# Patient Record
Sex: FEMALE | Race: WHITE | NOT HISPANIC OR LATINO | Employment: OTHER | URBAN - METROPOLITAN AREA
[De-identification: names, ages, dates, MRNs, and addresses within clinical notes are randomized per-mention and may not be internally consistent; named-entity substitution may affect disease eponyms.]

---

## 2017-02-14 RX ORDER — OMEPRAZOLE 20 MG/1
CAPSULE, DELAYED RELEASE ORAL
Qty: 90 CAPSULE | Refills: 2 | Status: SHIPPED | OUTPATIENT
Start: 2017-02-14 | End: 2017-11-07 | Stop reason: SDUPTHER

## 2017-04-03 ENCOUNTER — RESULTS ENCOUNTER (OUTPATIENT)
Dept: INTERNAL MEDICINE | Facility: CLINIC | Age: 82
End: 2017-04-03

## 2017-04-03 DIAGNOSIS — E11.8 TYPE 2 DIABETES MELLITUS WITH COMPLICATION, UNSPECIFIED LONG TERM INSULIN USE STATUS: ICD-10-CM

## 2017-04-03 DIAGNOSIS — E78.5 HYPERLIPIDEMIA, UNSPECIFIED HYPERLIPIDEMIA TYPE: ICD-10-CM

## 2017-04-11 LAB
ALBUMIN SERPL-MCNC: 4.1 G/DL (ref 3.5–5.2)
ALBUMIN/GLOB SERPL: 1.5 G/DL
ALP SERPL-CCNC: 70 U/L (ref 39–117)
ALT SERPL-CCNC: 26 U/L (ref 1–33)
AST SERPL-CCNC: 27 U/L (ref 1–32)
BILIRUB SERPL-MCNC: 0.5 MG/DL (ref 0.1–1.2)
BUN SERPL-MCNC: 28 MG/DL (ref 8–23)
BUN/CREAT SERPL: 38.4 (ref 7–25)
CALCIUM SERPL-MCNC: 9.6 MG/DL (ref 8.6–10.5)
CHLORIDE SERPL-SCNC: 102 MMOL/L (ref 98–107)
CHOLEST SERPL-MCNC: 179 MG/DL (ref 0–200)
CO2 SERPL-SCNC: 30.4 MMOL/L (ref 22–29)
CREAT SERPL-MCNC: 0.73 MG/DL (ref 0.57–1)
GLOBULIN SER CALC-MCNC: 2.8 GM/DL
GLUCOSE SERPL-MCNC: 122 MG/DL (ref 65–99)
HBA1C MFR BLD: 5.85 % (ref 4.8–5.6)
HDLC SERPL-MCNC: 73 MG/DL (ref 40–60)
LDLC SERPL CALC-MCNC: 94 MG/DL (ref 0–100)
LDLC/HDLC SERPL: 1.29 {RATIO}
POTASSIUM SERPL-SCNC: 3.7 MMOL/L (ref 3.5–5.2)
PROT SERPL-MCNC: 6.9 G/DL (ref 6–8.5)
SODIUM SERPL-SCNC: 146 MMOL/L (ref 136–145)
TRIGL SERPL-MCNC: 60 MG/DL (ref 0–150)
TSH SERPL DL<=0.005 MIU/L-ACNC: 1.52 MIU/ML (ref 0.27–4.2)
VLDLC SERPL CALC-MCNC: 12 MG/DL (ref 5–40)

## 2017-04-14 ENCOUNTER — OFFICE VISIT (OUTPATIENT)
Dept: INTERNAL MEDICINE | Facility: CLINIC | Age: 82
End: 2017-04-14

## 2017-04-14 VITALS
HEIGHT: 60 IN | WEIGHT: 158.6 LBS | SYSTOLIC BLOOD PRESSURE: 112 MMHG | OXYGEN SATURATION: 96 % | DIASTOLIC BLOOD PRESSURE: 58 MMHG | BODY MASS INDEX: 31.14 KG/M2 | HEART RATE: 74 BPM

## 2017-04-14 DIAGNOSIS — E78.5 HYPERLIPIDEMIA, UNSPECIFIED HYPERLIPIDEMIA TYPE: ICD-10-CM

## 2017-04-14 DIAGNOSIS — R73.09 ELEVATED GLUCOSE: ICD-10-CM

## 2017-04-14 DIAGNOSIS — I10 ESSENTIAL HYPERTENSION: Primary | ICD-10-CM

## 2017-04-14 DIAGNOSIS — K21.9 GASTROESOPHAGEAL REFLUX DISEASE, ESOPHAGITIS PRESENCE NOT SPECIFIED: ICD-10-CM

## 2017-04-14 DIAGNOSIS — E03.8 OTHER SPECIFIED HYPOTHYROIDISM: ICD-10-CM

## 2017-04-14 PROCEDURE — 99214 OFFICE O/P EST MOD 30 MIN: CPT | Performed by: INTERNAL MEDICINE

## 2017-04-14 NOTE — PROGRESS NOTES
Subjective   Jessy Eubanks is a 82 y.o. female here today to f/u on HTN, hyperlipidemia, DM and hypothyroidism.  Pt c/o bilateral ear fullness and weight loss medicine.      History of Present Illness   Pt has been taking BP meds as prescribed without any problems.  No HA  No episodes of orthostasis  Pt has been taking cholesterol meds as prescribed.  No difficulties with myalgias.   Pt has been doing well with thyroid meds.  Taking as perscribed without any complications  She does have some chronic LBP with some sciatica- she is getting some relief from aleve    The following portions of the patient's history were reviewed and updated as appropriate: allergies, current medications, past medical history, past social history and problem list.  SHe does not like to eat much for veggies    Review of Systems   All other systems reviewed and are negative.      Objective   Physical Exam   Constitutional: She is oriented to person, place, and time. She appears well-developed and well-nourished.   HENT:   Head: Normocephalic and atraumatic.   Right Ear: External ear normal.   Left Ear: External ear normal.   Mouth/Throat: Oropharynx is clear and moist.   Eyes: Conjunctivae and EOM are normal. Pupils are equal, round, and reactive to light.   Neck: Normal range of motion. No tracheal deviation present. No thyromegaly present.   Cardiovascular: Normal rate, regular rhythm, normal heart sounds and intact distal pulses.    occas extra beat   Pulmonary/Chest: Effort normal and breath sounds normal.   Abdominal: Soft. Bowel sounds are normal. She exhibits no distension. There is no tenderness.   Musculoskeletal: Normal range of motion. She exhibits no edema or deformity.   Neurological: She is alert and oriented to person, place, and time.   Skin: Skin is warm and dry.   Psychiatric: She has a normal mood and affect. Her behavior is normal. Judgment and thought content normal.   Vitals reviewed.      Vitals:    04/14/17 1059    BP: 112/58   Pulse: 74   SpO2: 96%        Results Encounter on 04/03/2017   Component Date Value Ref Range Status   • Glucose 04/11/2017 122* 65 - 99 mg/dL Final   • BUN 04/11/2017 28* 8 - 23 mg/dL Final   • Creatinine 04/11/2017 0.73  0.57 - 1.00 mg/dL Final   • eGFR Non African Am 04/11/2017 76  >60 mL/min/1.73 Final    Comment: The MDRD GFR formula is only valid for adults with stable  renal function between ages 18 and 70.     • eGFR  Am 04/11/2017 93  >60 mL/min/1.73 Final   • BUN/Creatinine Ratio 04/11/2017 38.4* 7.0 - 25.0 Final   • Sodium 04/11/2017 146* 136 - 145 mmol/L Final   • Potassium 04/11/2017 3.7  3.5 - 5.2 mmol/L Final   • Chloride 04/11/2017 102  98 - 107 mmol/L Final   • Total CO2 04/11/2017 30.4* 22.0 - 29.0 mmol/L Final   • Calcium 04/11/2017 9.6  8.6 - 10.5 mg/dL Final   • Total Protein 04/11/2017 6.9  6.0 - 8.5 g/dL Final   • Albumin 04/11/2017 4.10  3.50 - 5.20 g/dL Final   • Globulin 04/11/2017 2.8  gm/dL Final   • A/G Ratio 04/11/2017 1.5  g/dL Final   • Total Bilirubin 04/11/2017 0.5  0.1 - 1.2 mg/dL Final   • Alkaline Phosphatase 04/11/2017 70  39 - 117 U/L Final   • AST (SGOT) 04/11/2017 27  1 - 32 U/L Final   • ALT (SGPT) 04/11/2017 26  1 - 33 U/L Final   • Total Cholesterol 04/11/2017 179  0 - 200 mg/dL Final   • Triglycerides 04/11/2017 60  0 - 150 mg/dL Final   • HDL Cholesterol 04/11/2017 73* 40 - 60 mg/dL Final   • VLDL Cholesterol 04/11/2017 12  5 - 40 mg/dL Final   • LDL Cholesterol  04/11/2017 94  0 - 100 mg/dL Final   • LDL/HDL Ratio 04/11/2017 1.29   Final   • TSH 04/11/2017 1.52  0.27 - 4.2 mIU/mL Final   • Hemoglobin A1C 04/11/2017 5.85* 4.80 - 5.60 % Final    Comment: Hemoglobin A1C Ranges:  Increased Risk for Diabetes  5.7% to 6.4%  Diabetes                     >= 6.5%  Diabetic Goal                < 7.0%         Current Outpatient Prescriptions:   •  aspirin 325 MG tablet, Take  by mouth., Disp: , Rfl:   •  atorvastatin (LIPITOR) 40 MG tablet, TAKE 1 TABLET  DAILY, Disp: 90 tablet, Rfl: 1  •  calcium (OS-ELIZABETH) 600 MG tablet, Take  by mouth daily., Disp: , Rfl:   •  Cholecalciferol (VITAMIN D3) 2000 UNITS tablet, Take 1 capsule by mouth., Disp: , Rfl:   •  Coenzyme Q10 (CO Q 10) 100 MG capsule, Take 1 capsule by mouth Daily., Disp: , Rfl:   •  levothyroxine (SYNTHROID, LEVOTHROID) 100 MCG tablet, Take 1 tablet by mouth daily., Disp: 90 tablet, Rfl: 3  •  losartan-hydrochlorothiazide (HYZAAR) 50-12.5 MG per tablet, TAKE 1 TABLET TWICE A DAY, Disp: 180 tablet, Rfl: 1  •  omeprazole (priLOSEC) 20 MG capsule, TAKE 1 CAPSULE DAILY, Disp: 90 capsule, Rfl: 2  •  potassium chloride (K-DUR,KLOR-CON) 20 MEQ CR tablet, TAKE 1 TABLET DAILY, Disp: 90 tablet, Rfl: 1      Assessment/Plan   Diagnoses and all orders for this visit:    Essential hypertension    Hyperlipidemia, unspecified hyperlipidemia type    Gastroesophageal reflux disease, esophagitis presence not specified    Other specified hypothyroidism      1. HTN- She is doing well with current meds  2. HPL- she is doing well with atorvastatin  3. GERD-she is doing well with   4. Hypothyroidism-She is doing well with current dose  5. LBP- she needs to be cautious with overusing the nsaids  She does not want ot see pain management and PT has not helped  SHe has had this for years and is stab;e

## 2017-05-01 RX ORDER — LOSARTAN POTASSIUM AND HYDROCHLOROTHIAZIDE 12.5; 5 MG/1; MG/1
TABLET ORAL
Qty: 180 TABLET | Refills: 1 | Status: SHIPPED | OUTPATIENT
Start: 2017-05-01 | End: 2017-10-12 | Stop reason: SDUPTHER

## 2017-05-01 RX ORDER — LEVOTHYROXINE SODIUM 0.1 MG/1
TABLET ORAL
Qty: 90 TABLET | Refills: 1 | Status: SHIPPED | OUTPATIENT
Start: 2017-05-01 | End: 2017-10-12 | Stop reason: SDUPTHER

## 2017-05-01 RX ORDER — POTASSIUM CHLORIDE 20 MEQ/1
TABLET, EXTENDED RELEASE ORAL
Qty: 90 TABLET | Refills: 1 | Status: SHIPPED | OUTPATIENT
Start: 2017-05-01 | End: 2017-10-12 | Stop reason: SDUPTHER

## 2017-06-16 RX ORDER — ATORVASTATIN CALCIUM 40 MG/1
TABLET, FILM COATED ORAL
Qty: 90 TABLET | Refills: 1 | Status: SHIPPED | OUTPATIENT
Start: 2017-06-16 | End: 2017-11-21 | Stop reason: SDUPTHER

## 2017-10-06 DIAGNOSIS — R73.09 ELEVATED GLUCOSE: ICD-10-CM

## 2017-10-06 DIAGNOSIS — I10 ESSENTIAL HYPERTENSION: ICD-10-CM

## 2017-10-06 DIAGNOSIS — E78.5 HYPERLIPIDEMIA, UNSPECIFIED HYPERLIPIDEMIA TYPE: ICD-10-CM

## 2017-10-10 LAB
ALBUMIN SERPL-MCNC: 3.8 G/DL (ref 3.5–5.2)
ALBUMIN/GLOB SERPL: 1.5 G/DL
ALP SERPL-CCNC: 73 U/L (ref 39–117)
ALT SERPL-CCNC: 20 U/L (ref 1–33)
AST SERPL-CCNC: 25 U/L (ref 1–32)
BILIRUB SERPL-MCNC: 0.5 MG/DL (ref 0.1–1.2)
BUN SERPL-MCNC: 17 MG/DL (ref 8–23)
BUN/CREAT SERPL: 21.8 (ref 7–25)
CALCIUM SERPL-MCNC: 9.3 MG/DL (ref 8.6–10.5)
CHLORIDE SERPL-SCNC: 101 MMOL/L (ref 98–107)
CHOLEST SERPL-MCNC: 143 MG/DL (ref 0–200)
CO2 SERPL-SCNC: 31 MMOL/L (ref 22–29)
CREAT SERPL-MCNC: 0.78 MG/DL (ref 0.57–1)
FT4I SERPL CALC-MCNC: 2.3 (ref 1.2–4.9)
GLOBULIN SER CALC-MCNC: 2.5 GM/DL
GLUCOSE SERPL-MCNC: 123 MG/DL (ref 65–99)
HBA1C MFR BLD: 6.1 % (ref 4.8–5.6)
HDLC SERPL-MCNC: 61 MG/DL (ref 40–60)
LDLC SERPL CALC-MCNC: 67 MG/DL (ref 0–100)
LDLC/HDLC SERPL: 1.1 {RATIO}
POTASSIUM SERPL-SCNC: 3.4 MMOL/L (ref 3.5–5.2)
PROT SERPL-MCNC: 6.3 G/DL (ref 6–8.5)
SODIUM SERPL-SCNC: 144 MMOL/L (ref 136–145)
T3RU NFR SERPL: 28 % (ref 24–39)
T4 SERPL-MCNC: 8.3 UG/DL (ref 4.5–12)
TRIGL SERPL-MCNC: 73 MG/DL (ref 0–150)
TSH SERPL DL<=0.005 MIU/L-ACNC: 1.15 UIU/ML (ref 0.45–4.5)
VLDLC SERPL CALC-MCNC: 14.6 MG/DL (ref 5–40)

## 2017-10-12 RX ORDER — LEVOTHYROXINE SODIUM 0.1 MG/1
TABLET ORAL
Qty: 90 TABLET | Refills: 1 | Status: SHIPPED | OUTPATIENT
Start: 2017-10-12 | End: 2018-04-06 | Stop reason: SDUPTHER

## 2017-10-12 RX ORDER — POTASSIUM CHLORIDE 20 MEQ/1
TABLET, EXTENDED RELEASE ORAL
Qty: 90 TABLET | Refills: 1 | Status: SHIPPED | OUTPATIENT
Start: 2017-10-12 | End: 2018-04-06 | Stop reason: SDUPTHER

## 2017-10-12 RX ORDER — LOSARTAN POTASSIUM AND HYDROCHLOROTHIAZIDE 12.5; 5 MG/1; MG/1
TABLET ORAL
Qty: 180 TABLET | Refills: 1 | Status: SHIPPED | OUTPATIENT
Start: 2017-10-12 | End: 2018-04-06 | Stop reason: SDUPTHER

## 2017-10-13 ENCOUNTER — OFFICE VISIT (OUTPATIENT)
Dept: INTERNAL MEDICINE | Facility: CLINIC | Age: 82
End: 2017-10-13

## 2017-10-13 VITALS
BODY MASS INDEX: 29.84 KG/M2 | HEIGHT: 60 IN | HEART RATE: 81 BPM | DIASTOLIC BLOOD PRESSURE: 68 MMHG | WEIGHT: 152 LBS | SYSTOLIC BLOOD PRESSURE: 126 MMHG | OXYGEN SATURATION: 96 %

## 2017-10-13 DIAGNOSIS — E11.8 TYPE 2 DIABETES MELLITUS WITH COMPLICATION, UNSPECIFIED LONG TERM INSULIN USE STATUS: Primary | ICD-10-CM

## 2017-10-13 DIAGNOSIS — Z00.00 MEDICARE ANNUAL WELLNESS VISIT, INITIAL: ICD-10-CM

## 2017-10-13 DIAGNOSIS — I10 ESSENTIAL HYPERTENSION: ICD-10-CM

## 2017-10-13 DIAGNOSIS — E78.5 HYPERLIPIDEMIA, UNSPECIFIED HYPERLIPIDEMIA TYPE: ICD-10-CM

## 2017-10-13 DIAGNOSIS — M25.475 SWELLING OF FOOT JOINT, LEFT: ICD-10-CM

## 2017-10-13 PROCEDURE — 90662 IIV NO PRSV INCREASED AG IM: CPT | Performed by: INTERNAL MEDICINE

## 2017-10-13 PROCEDURE — G0438 PPPS, INITIAL VISIT: HCPCS | Performed by: INTERNAL MEDICINE

## 2017-10-13 PROCEDURE — 99213 OFFICE O/P EST LOW 20 MIN: CPT | Performed by: INTERNAL MEDICINE

## 2017-10-13 PROCEDURE — G0008 ADMIN INFLUENZA VIRUS VAC: HCPCS | Performed by: INTERNAL MEDICINE

## 2017-10-13 RX ORDER — NAPROXEN SODIUM 220 MG
220 TABLET ORAL 2 TIMES DAILY WITH MEALS
COMMUNITY
End: 2022-02-28

## 2017-10-13 NOTE — PROGRESS NOTES
QUICK REFERENCE INFORMATION:  The ABCs of the Annual Wellness Visit    Initial Medicare Wellness Visit    HEALTH RISK ASSESSMENT    1934    Recent Hospitalizations:  No hospitalization(s) within the last year..        Current Medical Providers:  Patient Care Team:  Any Carbajal MD as PCP - General  Katherin Carreno DPM as PCP - Claims Attributed        Smoking Status:  History   Smoking Status   • Never Smoker   Smokeless Tobacco   • Not on file       Alcohol Consumption:  History   Alcohol Use No       Depression Screen:   PHQ-2/PHQ-9 Depression Screening 10/13/2017   Little interest or pleasure in doing things 0   Feeling down, depressed, or hopeless 0   Trouble falling or staying asleep, or sleeping too much 3   Feeling tired or having little energy 0   Poor appetite or overeating 0   Feeling bad about yourself - or that you are a failure or have let yourself or your family down 0   Trouble concentrating on things, such as reading the newspaper or watching television 0   Moving or speaking so slowly that other people could have noticed. Or the opposite - being so fidgety or restless that you have been moving around a lot more than usual 0   Thoughts that you would be better off dead, or of hurting yourself in some way 0   Total Score 3       Health Habits and Functional and Cognitive Screening:  Functional & Cognitive Status 10/13/2017   Do you have difficulty preparing food and eating? No   Do you have difficulty bathing yourself? No   Do you have difficulty getting dressed? No   Do you have difficulty using the toilet? No   Do you have difficulty moving around from place to place? No   In the past year have you fallen or experienced a near fall? No   Do you need help using the phone?  No   Are you deaf or do you have serious difficulty hearing?  No   Do you need help with transportation? No   Do you need help shopping? No   Do you need help preparing meals?  No   Do you need help with housework?  No   Do you  need help with laundry? No   Do you need help taking your medications? No   Do you need help managing money? No   Do you have difficulty concentrating, remembering or making decisions? No                  Does the patient have evidence of cognitive impairment? No    Asiprin use counseling: Does not need ASA but is currently taking (advised patient that ASA is not indicated and patient chooses to stop it)      Recent Lab Results:    Visual Acuity:  No exam data present    Age-appropriate Screening Schedule:  Refer to the list below for future screening recommendations based on patient's age, sex and/or medical conditions. Orders for these recommended tests are listed in the plan section. The patient has been provided with a written plan.    Health Maintenance   Topic Date Due   • TDAP/TD VACCINES (1 - Tdap) 10/07/1953   • INFLUENZA VACCINE  08/01/2017   • HEMOGLOBIN A1C  04/09/2018   • DIABETIC FOOT EXAM  04/14/2018   • URINE MICROALBUMIN  04/14/2018   • DIABETIC EYE EXAM  05/31/2018   • LIPID PANEL  10/09/2018   • MAMMOGRAM  04/14/2019   • DXA SCAN  04/14/2019   • PNEUMOCOCCAL VACCINES (65+ LOW/MEDIUM RISK)  Completed   • ZOSTER VACCINE  Completed        Subjective   History of Present Illness    Jessy Eubanks is a 83 y.o. female who presents for an Annual Wellness Visit.    The following portions of the patient's history were reviewed and updated as appropriate: allergies, current medications, past family history, past medical history, past social history, past surgical history and problem list.    Outpatient Medications Prior to Visit   Medication Sig Dispense Refill   • aspirin 325 MG tablet Take  by mouth.     • atorvastatin (LIPITOR) 40 MG tablet TAKE 1 TABLET DAILY 90 tablet 1   • calcium (OS-ELIZABETH) 600 MG tablet Take  by mouth daily.     • Cholecalciferol (VITAMIN D3) 2000 UNITS tablet Take 1 capsule by mouth.     • Coenzyme Q10 (CO Q 10) 100 MG capsule Take 1 capsule by mouth Daily.     • levothyroxine  "(SYNTHROID, LEVOTHROID) 100 MCG tablet TAKE 1 TABLET DAILY 90 tablet 1   • losartan-hydrochlorothiazide (HYZAAR) 50-12.5 MG per tablet TAKE 1 TABLET TWICE A  tablet 1   • omeprazole (priLOSEC) 20 MG capsule TAKE 1 CAPSULE DAILY 90 capsule 2   • potassium chloride (K-DUR,KLOR-CON) 20 MEQ CR tablet TAKE 1 TABLET DAILY 90 tablet 1     No facility-administered medications prior to visit.        Patient Active Problem List   Diagnosis   • Diabetic peripheral neuropathy   • Gastroesophageal reflux disease   • Hyperlipidemia   • Hypertension   • Hypothyroidism   • Restless legs syndrome   • Type 2 diabetes mellitus   • Cobalamin deficiency   • Vitamin D deficiency   • Sciatica   • Hematuria       Advance Care Planning:  has an advance directive - a copy HAS NOT been provided. Have asked the patient to send this to us to add to record.    Identification of Risk Factors:  Risk factors include: weight , cardiovascular risk and increased fall risk. She has been doing well with a decreased calorie diet    Review of Systems    Compared to one year ago, the patient feels her physical health is the same.  Compared to one year ago, the patient feels her mental health is the same.    Objective     Physical Exam    Vitals:    10/13/17 1120   BP: 126/68   BP Location: Left arm   Patient Position: Sitting   Pulse: 81   SpO2: 96%   Weight: 152 lb (68.9 kg)   Height: 60.25\" (153 cm)   PainSc:   5       Body mass index is 29.44 kg/(m^2).  Discussed the patient's BMI with her. The BMI is above average; BMI management plan is completed.    Assessment/Plan   Patient Self-Management and Personalized Health Advice  The patient has been provided with information about: diet, exercise, prevention of cardiac or vascular disease, the relationship between weight and GERD and fall prevention and preventive services including:   · Exercise counseling provided, Fall Risk plan of care done, Nutrition counseling provided.    Visit Diagnoses:    " ICD-10-CM ICD-9-CM   1. Type 2 diabetes mellitus with complication, unspecified long term insulin use status E11.8 250.90   2. Essential hypertension I10 401.9   3. Hyperlipidemia, unspecified hyperlipidemia type E78.5 272.4   4. Swelling of foot joint, left M25.475 719.07       No orders of the defined types were placed in this encounter.      Outpatient Encounter Prescriptions as of 10/13/2017   Medication Sig Dispense Refill   • aspirin 325 MG tablet Take  by mouth.     • atorvastatin (LIPITOR) 40 MG tablet TAKE 1 TABLET DAILY 90 tablet 1   • calcium (OS-ELIZABETH) 600 MG tablet Take  by mouth daily.     • Cholecalciferol (VITAMIN D3) 2000 UNITS tablet Take 1 capsule by mouth.     • Coenzyme Q10 (CO Q 10) 100 MG capsule Take 1 capsule by mouth Daily.     • levothyroxine (SYNTHROID, LEVOTHROID) 100 MCG tablet TAKE 1 TABLET DAILY 90 tablet 1   • losartan-hydrochlorothiazide (HYZAAR) 50-12.5 MG per tablet TAKE 1 TABLET TWICE A  tablet 1   • naproxen sodium (ALEVE) 220 MG tablet Take 220 mg by mouth 2 (Two) Times a Day With Meals.     • Omega-3 Fatty Acids (OMEGA 3 PO) Take 1 capsule by mouth Daily.     • omeprazole (priLOSEC) 20 MG capsule TAKE 1 CAPSULE DAILY 90 capsule 2   • potassium chloride (K-DUR,KLOR-CON) 20 MEQ CR tablet TAKE 1 TABLET DAILY 90 tablet 1     No facility-administered encounter medications on file as of 10/13/2017.        Reviewed use of high risk medication in the elderly: yes  Reviewed for potential of harmful drug interactions in the elderly: yes    Follow Up:  No Follow-up on file.     An After Visit Summary and PPPS with all of these plans were given to the patient.   She is continuing to work on diet and exercise  She is cautious on steps   I have rec silver sneakers

## 2017-10-13 NOTE — PATIENT INSTRUCTIONS
Fall Prevention in the Home   Falls can cause injuries. They can happen to people of all ages. There are many things you can do to make your home safe and to help prevent falls.   WHAT CAN I DO ON THE OUTSIDE OF MY HOME?  · Regularly fix the edges of walkways and driveways and fix any cracks.  · Remove anything that might make you trip as you walk through a door, such as a raised step or threshold.  · Trim any bushes or trees on the path to your home.  · Use bright outdoor lighting.  · Clear any walking paths of anything that might make someone trip, such as rocks or tools.  · Regularly check to see if handrails are loose or broken. Make sure that both sides of any steps have handrails.  · Any raised decks and porches should have guardrails on the edges.  · Have any leaves, snow, or ice cleared regularly.  · Use sand or salt on walking paths during winter.  · Clean up any spills in your garage right away. This includes oil or grease spills.  WHAT CAN I DO IN THE BATHROOM?   · Use night lights.  · Install grab bars by the toilet and in the tub and shower. Do not use towel bars as grab bars.  · Use non-skid mats or decals in the tub or shower.  · If you need to sit down in the shower, use a plastic, non-slip stool.  · Keep the floor dry. Clean up any water that spills on the floor as soon as it happens.  · Remove soap buildup in the tub or shower regularly.  · Attach bath mats securely with double-sided non-slip rug tape.  · Do not have throw rugs and other things on the floor that can make you trip.  WHAT CAN I DO IN THE BEDROOM?  · Use night lights.  · Make sure that you have a light by your bed that is easy to reach.  · Do not use any sheets or blankets that are too big for your bed. They should not hang down onto the floor.  · Have a firm chair that has side arms. You can use this for support while you get dressed.  · Do not have throw rugs and other things on the floor that can make you trip.  WHAT CAN I DO IN  THE KITCHEN?  · Clean up any spills right away.  · Avoid walking on wet floors.  · Keep items that you use a lot in easy-to-reach places.  · If you need to reach something above you, use a strong step stool that has a grab bar.  · Keep electrical cords out of the way.  · Do not use floor polish or wax that makes floors slippery. If you must use wax, use non-skid floor wax.  · Do not have throw rugs and other things on the floor that can make you trip.  WHAT CAN I DO WITH MY STAIRS?  · Do not leave any items on the stairs.  · Make sure that there are handrails on both sides of the stairs and use them. Fix handrails that are broken or loose. Make sure that handrails are as long as the stairways.  · Check any carpeting to make sure that it is firmly attached to the stairs. Fix any carpet that is loose or worn.  · Avoid having throw rugs at the top or bottom of the stairs. If you do have throw rugs, attach them to the floor with carpet tape.  · Make sure that you have a light switch at the top of the stairs and the bottom of the stairs. If you do not have them, ask someone to add them for you.  WHAT ELSE CAN I DO TO HELP PREVENT FALLS?  · Wear shoes that:    Do not have high heels.    Have rubber bottoms.    Are comfortable and fit you well.    Are closed at the toe. Do not wear sandals.  · If you use a stepladder:    Make sure that it is fully opened. Do not climb a closed stepladder.    Make sure that both sides of the stepladder are locked into place.    Ask someone to hold it for you, if possible.  · Clearly riky and make sure that you can see:    Any grab bars or handrails.    First and last steps.    Where the edge of each step is.  · Use tools that help you move around (mobility aids) if they are needed. These include:    Canes.    Walkers.    Scooters.    Crutches.  · Turn on the lights when you go into a dark area. Replace any light bulbs as soon as they burn out.  · Set up your furniture so you have a clear  path. Avoid moving your furniture around.  · If any of your floors are uneven, fix them.  · If there are any pets around you, be aware of where they are.  · Review your medicines with your doctor. Some medicines can make you feel dizzy. This can increase your chance of falling.  Ask your doctor what other things that you can do to help prevent falls.     This information is not intended to replace advice given to you by your health care provider. Make sure you discuss any questions you have with your health care provider.     Document Released: 10/14/2010 Document Revised: 2016 Document Reviewed: 2016  [employer] Interactive Patient Education © [employer]    Medicare Wellness  Personal Prevention Plan of Service     Date of Office Visit:  10/13/2017  Encounter Provider:  Any Carbajal MD  Place of Service:  [facility]  Patient Name: Jessy Eubanks  :  1934    As part of the Medicare Wellness portion of your visit today, we are providing you with this personalized preventive plan of services (PPPS). This plan is based upon recommendations of the [place] Preventive Services Task Force (USPSTF) and the Advisory Committee on Immunization Practices (ACIP).    This lists the preventive care services that should be considered, and provides dates of when you are due. Items listed as completed are up-to-date and do not require any further intervention.    Health Maintenance   Topic Date Due   • TDAP/TD VACCINES (1 - Tdap) 10/07/1953   • INFLUENZA VACCINE  2017   • HEMOGLOBIN A1C  2018   • DIABETIC FOOT EXAM  2018   • URINE MICROALBUMIN  2018   • DIABETIC EYE EXAM  2018   • LIPID PANEL  10/09/2018   • MEDICARE ANNUAL WELLNESS  10/13/2018   • MAMMOGRAM  2019   • DXA SCAN  2019   • PNEUMOCOCCAL VACCINES (65+ LOW/MEDIUM RISK)  Completed   • ZOSTER VACCINE  Completed       No orders of the defined types were placed in  this encounter.      No Follow-up on file.

## 2017-10-13 NOTE — PROGRESS NOTES
Subjective   Jessy Eubanks is a 83 y.o. female here today to f/u on Hyperlipidemia, HTN, DM and hypothyroidism.  Pt c/o left sciatica pain that is causing calf pain and left foot swelling.  Pt uses solanpas patches.      History of Present Illness   Pt has been taking BP meds as prescribed without any problems.  No HA  No episodes of orthostasis  Pt has been doing well with thyroid meds.  Taking as perscribed without any complications  She had been using a strap around her left calf to help with the pain that radiates into her calf    The following portions of the patient's history were reviewed and updated as appropriate: allergies, current medications, past medical history, past social history and problem list.  She denies any change in diet    Review of Systems   All other systems reviewed and are negative.      Objective   Physical Exam   Constitutional: She is oriented to person, place, and time. She appears well-developed and well-nourished.   HENT:   Head: Normocephalic and atraumatic.   Right Ear: External ear normal.   Left Ear: External ear normal.   Mouth/Throat: Oropharynx is clear and moist.   Eyes: Conjunctivae and EOM are normal. Pupils are equal, round, and reactive to light.   Neck: Normal range of motion. No tracheal deviation present. No thyromegaly present.   Cardiovascular: Normal rate, regular rhythm, normal heart sounds and intact distal pulses.    Pulmonary/Chest: Effort normal and breath sounds normal.   Musculoskeletal: Normal range of motion. She exhibits no edema or deformity.   Left foot and ankle edema   Neurological: She is alert and oriented to person, place, and time.   Skin: Skin is warm and dry.   Psychiatric: She has a normal mood and affect. Her behavior is normal. Judgment and thought content normal.   Vitals reviewed.      Vitals:    10/13/17 1120   BP: 126/68   Pulse: 81   SpO2: 96%     Orders Only on 10/06/2017   Component Date Value Ref Range Status   • Glucose  10/09/2017 123* 65 - 99 mg/dL Final   • BUN 10/09/2017 17  8 - 23 mg/dL Final   • Creatinine 10/09/2017 0.78  0.57 - 1.00 mg/dL Final   • eGFR Non  Am 10/09/2017 71  >60 mL/min/1.73 Final    Comment: The MDRD GFR formula is only valid for adults with stable  renal function between ages 18 and 70.     • eGFR  Am 10/09/2017 85  >60 mL/min/1.73 Final   • BUN/Creatinine Ratio 10/09/2017 21.8  7.0 - 25.0 Final   • Sodium 10/09/2017 144  136 - 145 mmol/L Final   • Potassium 10/09/2017 3.4* 3.5 - 5.2 mmol/L Final   • Chloride 10/09/2017 101  98 - 107 mmol/L Final   • Total CO2 10/09/2017 31.0* 22.0 - 29.0 mmol/L Final   • Calcium 10/09/2017 9.3  8.6 - 10.5 mg/dL Final   • Total Protein 10/09/2017 6.3  6.0 - 8.5 g/dL Final   • Albumin 10/09/2017 3.80  3.50 - 5.20 g/dL Final   • Globulin 10/09/2017 2.5  gm/dL Final   • A/G Ratio 10/09/2017 1.5  g/dL Final   • Total Bilirubin 10/09/2017 0.5  0.1 - 1.2 mg/dL Final   • Alkaline Phosphatase 10/09/2017 73  39 - 117 U/L Final   • AST (SGOT) 10/09/2017 25  1 - 32 U/L Final   • ALT (SGPT) 10/09/2017 20  1 - 33 U/L Final   • Total Cholesterol 10/09/2017 143  0 - 200 mg/dL Final   • Triglycerides 10/09/2017 73  0 - 150 mg/dL Final   • HDL Cholesterol 10/09/2017 61* 40 - 60 mg/dL Final   • VLDL Cholesterol 10/09/2017 14.6  5 - 40 mg/dL Final   • LDL Cholesterol  10/09/2017 67  0 - 100 mg/dL Final   • LDL/HDL Ratio 10/09/2017 1.10   Final   • TSH 10/09/2017 1.150  0.450 - 4.500 uIU/mL Final   • T4, Total 10/09/2017 8.3  4.5 - 12.0 ug/dL Final   • T3 Uptake 10/09/2017 28  24 - 39 % Final   • Free Thyroxine Index 10/09/2017 2.3  1.2 - 4.9 Final   • Hemoglobin A1C 10/09/2017 6.10* 4.80 - 5.60 % Final    Comment: Hemoglobin A1C Ranges:  Increased Risk for Diabetes  5.7% to 6.4%  Diabetes                     >= 6.5%  Diabetic Goal                < 7.0%          Current Outpatient Prescriptions:   •  aspirin 325 MG tablet, Take  by mouth., Disp: , Rfl:   •  atorvastatin  (LIPITOR) 40 MG tablet, TAKE 1 TABLET DAILY, Disp: 90 tablet, Rfl: 1  •  calcium (OS-ELIZABETH) 600 MG tablet, Take  by mouth daily., Disp: , Rfl:   •  Cholecalciferol (VITAMIN D3) 2000 UNITS tablet, Take 1 capsule by mouth., Disp: , Rfl:   •  Coenzyme Q10 (CO Q 10) 100 MG capsule, Take 1 capsule by mouth Daily., Disp: , Rfl:   •  levothyroxine (SYNTHROID, LEVOTHROID) 100 MCG tablet, TAKE 1 TABLET DAILY, Disp: 90 tablet, Rfl: 1  •  losartan-hydrochlorothiazide (HYZAAR) 50-12.5 MG per tablet, TAKE 1 TABLET TWICE A DAY, Disp: 180 tablet, Rfl: 1  •  naproxen sodium (ALEVE) 220 MG tablet, Take 220 mg by mouth 2 (Two) Times a Day With Meals., Disp: , Rfl:   •  Omega-3 Fatty Acids (OMEGA 3 PO), Take 1 capsule by mouth Daily., Disp: , Rfl:   •  omeprazole (priLOSEC) 20 MG capsule, TAKE 1 CAPSULE DAILY, Disp: 90 capsule, Rfl: 2  •  potassium chloride (K-DUR,KLOR-CON) 20 MEQ CR tablet, TAKE 1 TABLET DAILY, Disp: 90 tablet, Rfl: 1      Assessment/Plan   Diagnoses and all orders for this visit:    Type 2 diabetes mellitus with complication, unspecified long term insulin use status    Essential hypertension    Hyperlipidemia, unspecified hyperlipidemia type    Swelling of foot joint, left    1. DM- well controlled  2. Left foot swelling-  I believe that this is related to the strap she wore around her left leg   THis has since been removed and we will check a doppler  3. HTN-doing well with losartan  4. HPL- doing well with atorvastatin  5. gerd-she wants to do a trial off the ppi

## 2017-10-18 ENCOUNTER — TELEPHONE (OUTPATIENT)
Dept: INTERNAL MEDICINE | Facility: CLINIC | Age: 82
End: 2017-10-18

## 2017-10-18 DIAGNOSIS — M79.89 PAIN AND SWELLING OF LEFT LOWER LEG: Primary | ICD-10-CM

## 2017-10-18 DIAGNOSIS — M79.662 PAIN AND SWELLING OF LEFT LOWER LEG: Primary | ICD-10-CM

## 2017-10-18 NOTE — TELEPHONE ENCOUNTER
I CALLED THE [facility] AND [facility] IN BOTH LOCATIONS AND THEY COULD NOT FIND A VANESSA IN ANY DEPT. I TOLD THEM THAT I DID ADD THE SECOND DIAGNOSIS AND THEY SAID IT SHOULD BE FINE B/C ITS IN EPIC NOW.  THE NUMBER LISTED IS INCORRECT AS WELL.

## 2017-10-18 NOTE — TELEPHONE ENCOUNTER
----- Message from Any Carbajal MD sent at 10/18/2017  2:43 PM EDT -----  Left lower extremity swelling and pain  ----- Message -----     From: [facility]     Sent: 10/18/2017  12:56 PM       To: Any Carbajal MD    ANY OTHER DIAGNOSIS THAT WE CAN USE FOR THIS TEST?  ----- Message -----     From: Caro Murry     Sent: 10/18/2017  11:30 AM       To: [facility] CALLING NEEDING A REVISED ORDER ON VENOUS LOWER EXTREMITY. THEY NEED A NEW DIAGNOSIS. PLEASE CALL VANESSA [phone]

## 2017-10-20 ENCOUNTER — HOSPITAL ENCOUNTER (OUTPATIENT)
Dept: CARDIOLOGY | Facility: HOSPITAL | Age: 82
Discharge: HOME | End: 2017-10-20
Admitting: INTERNAL MEDICINE

## 2017-10-20 LAB
BH CV LOWER VASCULAR LEFT COMMON FEMORAL AUGMENT: NORMAL
BH CV LOWER VASCULAR LEFT COMMON FEMORAL COMPETENT: NORMAL
BH CV LOWER VASCULAR LEFT COMMON FEMORAL COMPRESS: NORMAL
BH CV LOWER VASCULAR LEFT COMMON FEMORAL PHASIC: NORMAL
BH CV LOWER VASCULAR LEFT COMMON FEMORAL SPONT: NORMAL
BH CV LOWER VASCULAR LEFT DISTAL FEMORAL COMPRESS: NORMAL
BH CV LOWER VASCULAR LEFT GASTRONEMIUS COMPRESS: NORMAL
BH CV LOWER VASCULAR LEFT GREATER SAPH AK COMPRESS: NORMAL
BH CV LOWER VASCULAR LEFT GREATER SAPH BK COMPRESS: NORMAL
BH CV LOWER VASCULAR LEFT LESSER SAPH COMPRESS: NORMAL
BH CV LOWER VASCULAR LEFT MID FEMORAL AUGMENT: NORMAL
BH CV LOWER VASCULAR LEFT MID FEMORAL COMPETENT: NORMAL
BH CV LOWER VASCULAR LEFT MID FEMORAL COMPRESS: NORMAL
BH CV LOWER VASCULAR LEFT MID FEMORAL PHASIC: NORMAL
BH CV LOWER VASCULAR LEFT MID FEMORAL SPONT: NORMAL
BH CV LOWER VASCULAR LEFT PERONEAL COMPRESS: NORMAL
BH CV LOWER VASCULAR LEFT POPLITEAL AUGMENT: NORMAL
BH CV LOWER VASCULAR LEFT POPLITEAL COMPETENT: NORMAL
BH CV LOWER VASCULAR LEFT POPLITEAL COMPRESS: NORMAL
BH CV LOWER VASCULAR LEFT POPLITEAL PHASIC: NORMAL
BH CV LOWER VASCULAR LEFT POPLITEAL SPONT: NORMAL
BH CV LOWER VASCULAR LEFT POSTERIOR TIBIAL COMPRESS: NORMAL
BH CV LOWER VASCULAR LEFT PROXIMAL FEMORAL COMPRESS: NORMAL
BH CV LOWER VASCULAR LEFT SAPHENOFEMORAL JUNCTION AUGMENT: NORMAL
BH CV LOWER VASCULAR LEFT SAPHENOFEMORAL JUNCTION COMPETENT: NORMAL
BH CV LOWER VASCULAR LEFT SAPHENOFEMORAL JUNCTION COMPRESS: NORMAL
BH CV LOWER VASCULAR LEFT SAPHENOFEMORAL JUNCTION PHASIC: NORMAL
BH CV LOWER VASCULAR LEFT SAPHENOFEMORAL JUNCTION SPONT: NORMAL
BH CV LOWER VASCULAR RIGHT COMMON FEMORAL AUGMENT: NORMAL
BH CV LOWER VASCULAR RIGHT COMMON FEMORAL COMPETENT: NORMAL
BH CV LOWER VASCULAR RIGHT COMMON FEMORAL COMPRESS: NORMAL
BH CV LOWER VASCULAR RIGHT COMMON FEMORAL PHASIC: NORMAL
BH CV LOWER VASCULAR RIGHT COMMON FEMORAL SPONT: NORMAL

## 2017-10-20 PROCEDURE — 93971 EXTREMITY STUDY: CPT

## 2017-11-07 RX ORDER — OMEPRAZOLE 20 MG/1
CAPSULE, DELAYED RELEASE ORAL
Qty: 90 CAPSULE | Refills: 2 | Status: SHIPPED | OUTPATIENT
Start: 2017-11-07 | End: 2018-07-14 | Stop reason: SDUPTHER

## 2017-11-21 RX ORDER — ATORVASTATIN CALCIUM 40 MG/1
TABLET, FILM COATED ORAL
Qty: 90 TABLET | Refills: 1 | Status: SHIPPED | OUTPATIENT
Start: 2017-11-21 | End: 2018-04-30 | Stop reason: SDUPTHER

## 2018-04-06 RX ORDER — POTASSIUM CHLORIDE 20 MEQ/1
TABLET, EXTENDED RELEASE ORAL
Qty: 90 TABLET | Refills: 1 | Status: SHIPPED | OUTPATIENT
Start: 2018-04-06 | End: 2018-10-14 | Stop reason: SDUPTHER

## 2018-04-06 RX ORDER — LOSARTAN POTASSIUM AND HYDROCHLOROTHIAZIDE 12.5; 5 MG/1; MG/1
TABLET ORAL
Qty: 180 TABLET | Refills: 1 | Status: SHIPPED | OUTPATIENT
Start: 2018-04-06 | End: 2018-10-30

## 2018-04-06 RX ORDER — LEVOTHYROXINE SODIUM 0.1 MG/1
TABLET ORAL
Qty: 90 TABLET | Refills: 1 | Status: SHIPPED | OUTPATIENT
Start: 2018-04-06 | End: 2018-10-14 | Stop reason: SDUPTHER

## 2018-04-13 ENCOUNTER — RESULTS ENCOUNTER (OUTPATIENT)
Dept: INTERNAL MEDICINE | Facility: CLINIC | Age: 83
End: 2018-04-13

## 2018-04-13 DIAGNOSIS — E11.8 TYPE 2 DIABETES MELLITUS WITH COMPLICATION, UNSPECIFIED LONG TERM INSULIN USE STATUS: ICD-10-CM

## 2018-04-13 DIAGNOSIS — E78.5 HYPERLIPIDEMIA, UNSPECIFIED HYPERLIPIDEMIA TYPE: ICD-10-CM

## 2018-04-23 LAB
ALBUMIN SERPL-MCNC: 4 G/DL (ref 3.5–5.2)
ALBUMIN/GLOB SERPL: 1.7 G/DL
ALP SERPL-CCNC: 62 U/L (ref 39–117)
ALT SERPL-CCNC: 17 U/L (ref 1–33)
AST SERPL-CCNC: 23 U/L (ref 1–32)
BILIRUB SERPL-MCNC: 0.6 MG/DL (ref 0.1–1.2)
BUN SERPL-MCNC: 12 MG/DL (ref 8–23)
BUN/CREAT SERPL: 15.2 (ref 7–25)
CALCIUM SERPL-MCNC: 9.4 MG/DL (ref 8.6–10.5)
CHLORIDE SERPL-SCNC: 100 MMOL/L (ref 98–107)
CHOLEST SERPL-MCNC: 153 MG/DL (ref 0–200)
CO2 SERPL-SCNC: 34.4 MMOL/L (ref 22–29)
CREAT SERPL-MCNC: 0.79 MG/DL (ref 0.57–1)
GFR SERPLBLD CREATININE-BSD FMLA CKD-EPI: 70 ML/MIN/1.73
GFR SERPLBLD CREATININE-BSD FMLA CKD-EPI: 84 ML/MIN/1.73
GLOBULIN SER CALC-MCNC: 2.4 GM/DL
GLUCOSE SERPL-MCNC: 136 MG/DL (ref 65–99)
HDLC SERPL-MCNC: 61 MG/DL (ref 40–60)
LDLC SERPL CALC-MCNC: 78 MG/DL (ref 0–100)
LDLC/HDLC SERPL: 1.28 {RATIO}
POTASSIUM SERPL-SCNC: 3.8 MMOL/L (ref 3.5–5.2)
PROT SERPL-MCNC: 6.4 G/DL (ref 6–8.5)
SODIUM SERPL-SCNC: 145 MMOL/L (ref 136–145)
TRIGL SERPL-MCNC: 70 MG/DL (ref 0–150)
TSH SERPL DL<=0.005 MIU/L-ACNC: 1.43 MIU/ML (ref 0.27–4.2)
VLDLC SERPL CALC-MCNC: 14 MG/DL (ref 5–40)

## 2018-04-27 ENCOUNTER — OFFICE VISIT (OUTPATIENT)
Dept: INTERNAL MEDICINE | Facility: CLINIC | Age: 83
End: 2018-04-27

## 2018-04-27 VITALS
TEMPERATURE: 98 F | SYSTOLIC BLOOD PRESSURE: 132 MMHG | HEIGHT: 60 IN | BODY MASS INDEX: 31.67 KG/M2 | WEIGHT: 161.3 LBS | HEART RATE: 71 BPM | DIASTOLIC BLOOD PRESSURE: 70 MMHG | OXYGEN SATURATION: 96 %

## 2018-04-27 DIAGNOSIS — E11.8 TYPE 2 DIABETES MELLITUS WITH COMPLICATION, UNSPECIFIED LONG TERM INSULIN USE STATUS: ICD-10-CM

## 2018-04-27 DIAGNOSIS — I10 ESSENTIAL HYPERTENSION: ICD-10-CM

## 2018-04-27 DIAGNOSIS — M54.32 SCIATICA OF LEFT SIDE: Primary | ICD-10-CM

## 2018-04-27 DIAGNOSIS — N30.01 ACUTE CYSTITIS WITH HEMATURIA: ICD-10-CM

## 2018-04-27 DIAGNOSIS — E78.5 HYPERLIPIDEMIA, UNSPECIFIED HYPERLIPIDEMIA TYPE: ICD-10-CM

## 2018-04-27 LAB
BILIRUB BLD-MCNC: NEGATIVE MG/DL
CLARITY, POC: ABNORMAL
COLOR UR: YELLOW
GLUCOSE UR STRIP-MCNC: NEGATIVE MG/DL
KETONES UR QL: NEGATIVE
LEUKOCYTE EST, POC: ABNORMAL
NITRITE UR-MCNC: POSITIVE MG/ML
PH UR: 7 [PH] (ref 5–8)
PROT UR STRIP-MCNC: NEGATIVE MG/DL
RBC # UR STRIP: ABNORMAL /UL
SP GR UR: 1.01 (ref 1–1.03)
UROBILINOGEN UR QL: NORMAL

## 2018-04-27 PROCEDURE — 81003 URINALYSIS AUTO W/O SCOPE: CPT | Performed by: INTERNAL MEDICINE

## 2018-04-27 PROCEDURE — 99214 OFFICE O/P EST MOD 30 MIN: CPT | Performed by: INTERNAL MEDICINE

## 2018-04-27 RX ORDER — BRIMONIDINE TARTRATE AND TIMOLOL MALEATE 2; 5 MG/ML; MG/ML
1 SOLUTION OPHTHALMIC EVERY 12 HOURS
COMMUNITY
End: 2019-05-09 | Stop reason: CLARIF

## 2018-04-27 RX ORDER — AMITRIPTYLINE HYDROCHLORIDE 10 MG/1
10 TABLET, FILM COATED ORAL NIGHTLY
Qty: 30 TABLET | Refills: 3 | Status: SHIPPED | OUTPATIENT
Start: 2018-04-27 | End: 2018-06-07 | Stop reason: SDUPTHER

## 2018-04-27 NOTE — PROGRESS NOTES
Subjective   Jessy Eubanks is a 83 y.o. female here today to f/u on HTN, hyperlipidemia and hypothyroidism.  Pt c/o left foot swelling.      History of Present Illness   Pt has been compliant with meds for GERD.  No sx as long as pt takes medicine as prescribed.  No epigastric pain or reflux sx  She says the chiro does help with the sciatica.  She is in pain a lot   It is bothering her to the point that it is bothering her balance.  She has been taking 400mg of ibuprofen tid and it does help  Pt has been doing well with thyroid meds.  Taking as perscribed without any complications  Pt has been taking BP meds as prescribed without any problems.  No HA  No episodes of orthostasis  SHe has also been having some itching with urination  No blood not pain  Some freq no odor    The following portions of the patient's history were reviewed and updated as appropriate: allergies, current medications, past medical history, past social history and problem list.  SHe denies any change in PA    Review of Systems   All other systems reviewed and are negative.      Objective   Physical Exam   Constitutional: She is oriented to person, place, and time. She appears well-developed and well-nourished.   HENT:   Head: Normocephalic and atraumatic.   Right Ear: External ear normal.   Left Ear: External ear normal.   Mouth/Throat: Oropharynx is clear and moist.   Eyes: Conjunctivae and EOM are normal. Pupils are equal, round, and reactive to light.   Neck: Normal range of motion. No tracheal deviation present. No thyromegaly present.   Cardiovascular: Normal rate, regular rhythm, normal heart sounds and intact distal pulses.    Pulmonary/Chest: Effort normal and breath sounds normal.   Abdominal: Soft. Bowel sounds are normal. She exhibits no distension. There is no tenderness.   Musculoskeletal: Normal range of motion. She exhibits no edema or deformity.   Neurological: She is alert and oriented to person, place, and time.   Skin:  Skin is warm and dry.   Psychiatric: She has a normal mood and affect. Her behavior is normal. Judgment and thought content normal.   Vitals reviewed.      Vitals:    04/27/18 1106   BP: 132/70   Pulse: 71   Temp: 98 °F (36.7 °C)   SpO2: 96%        Results Encounter on 04/13/2018   Component Date Value Ref Range Status   • Glucose 04/23/2018 136* 65 - 99 mg/dL Final   • BUN 04/23/2018 12  8 - 23 mg/dL Final   • Creatinine 04/23/2018 0.79  0.57 - 1.00 mg/dL Final   • eGFR Non  Am 04/23/2018 70  >60 mL/min/1.73 Final    Comment: The MDRD GFR formula is only valid for adults with stable  renal function between ages 18 and 70.     • eGFR  Am 04/23/2018 84  >60 mL/min/1.73 Final   • BUN/Creatinine Ratio 04/23/2018 15.2  7.0 - 25.0 Final   • Sodium 04/23/2018 145  136 - 145 mmol/L Final   • Potassium 04/23/2018 3.8  3.5 - 5.2 mmol/L Final   • Chloride 04/23/2018 100  98 - 107 mmol/L Final   • Total CO2 04/23/2018 34.4* 22.0 - 29.0 mmol/L Final   • Calcium 04/23/2018 9.4  8.6 - 10.5 mg/dL Final   • Total Protein 04/23/2018 6.4  6.0 - 8.5 g/dL Final   • Albumin 04/23/2018 4.00  3.50 - 5.20 g/dL Final   • Globulin 04/23/2018 2.4  gm/dL Final   • A/G Ratio 04/23/2018 1.7  g/dL Final   • Total Bilirubin 04/23/2018 0.6  0.1 - 1.2 mg/dL Final   • Alkaline Phosphatase 04/23/2018 62  39 - 117 U/L Final   • AST (SGOT) 04/23/2018 23  1 - 32 U/L Final   • ALT (SGPT) 04/23/2018 17  1 - 33 U/L Final   • Total Cholesterol 04/23/2018 153  0 - 200 mg/dL Final   • Triglycerides 04/23/2018 70  0 - 150 mg/dL Final   • HDL Cholesterol 04/23/2018 61* 40 - 60 mg/dL Final   • VLDL Cholesterol 04/23/2018 14  5 - 40 mg/dL Final   • LDL Cholesterol  04/23/2018 78  0 - 100 mg/dL Final   • LDL/HDL Ratio 04/23/2018 1.28   Final   • TSH 04/23/2018 1.43  0.27 - 4.2 mIU/mL Final       Current Outpatient Prescriptions:   •  aspirin 325 MG tablet, Take  by mouth., Disp: , Rfl:   •  atorvastatin (LIPITOR) 40 MG tablet, TAKE 1 TABLET DAILY,  Disp: 90 tablet, Rfl: 1  •  brimonidine-timolol (COMBIGAN) 0.2-0.5 % ophthalmic solution, 1 drop Every 12 (Twelve) Hours., Disp: , Rfl:   •  calcium (OS-ELIZABETH) 600 MG tablet, Take  by mouth daily., Disp: , Rfl:   •  Cholecalciferol (VITAMIN D3) 2000 UNITS tablet, Take 1 capsule by mouth., Disp: , Rfl:   •  Coenzyme Q10 (CO Q 10) 100 MG capsule, Take 1 capsule by mouth Daily., Disp: , Rfl:   •  levothyroxine (SYNTHROID, LEVOTHROID) 100 MCG tablet, TAKE 1 TABLET DAILY, Disp: 90 tablet, Rfl: 1  •  losartan-hydrochlorothiazide (HYZAAR) 50-12.5 MG per tablet, TAKE 1 TABLET TWICE A DAY, Disp: 180 tablet, Rfl: 1  •  naproxen sodium (ALEVE) 220 MG tablet, Take 220 mg by mouth 2 (Two) Times a Day With Meals., Disp: , Rfl:   •  Omega-3 Fatty Acids (OMEGA 3 PO), Take 1 capsule by mouth Daily., Disp: , Rfl:   •  omeprazole (priLOSEC) 20 MG capsule, TAKE 1 CAPSULE DAILY, Disp: 90 capsule, Rfl: 2  •  potassium chloride (K-DUR,KLOR-CON) 20 MEQ CR tablet, TAKE 1 TABLET DAILY, Disp: 90 tablet, Rfl: 1  •  amitriptyline (ELAVIL) 10 MG tablet, Take 1 tablet by mouth Every Night., Disp: 30 tablet, Rfl: 3      Assessment/Plan   Diagnoses and all orders for this visit:    Sciatica of left side    Essential hypertension    Hyperlipidemia, unspecified hyperlipidemia type    Type 2 diabetes mellitus with complication, unspecified long term insulin use status    Other orders  -     amitriptyline (ELAVIL) 10 MG tablet; Take 1 tablet by mouth Every Night.      1. LEft sided sciatica-  She has had this for years so I this point I and questioning the DX but pt is not interested iin MRI or pain management referral.  We will try elavil and the NSAID cream  2.  HPL- ok with atorvastatin-  3.  DM-  She needs an A1c  But she was ok in the past  4.  HTN-  Ok with current  5. UTI-  We will check cx and treat as indicated

## 2018-05-01 RX ORDER — ATORVASTATIN CALCIUM 40 MG/1
TABLET, FILM COATED ORAL
Qty: 90 TABLET | Refills: 1 | Status: SHIPPED | OUTPATIENT
Start: 2018-05-01 | End: 2018-11-17 | Stop reason: SDUPTHER

## 2018-05-03 LAB
BACTERIA UR CULT: ABNORMAL
BACTERIA UR CULT: ABNORMAL
OTHER ANTIBIOTIC SUSC ISLT: ABNORMAL

## 2018-05-03 RX ORDER — CIPROFLOXACIN 250 MG/1
250 TABLET, FILM COATED ORAL EVERY 12 HOURS SCHEDULED
Qty: 10 TABLET | Refills: 0 | Status: SHIPPED | OUTPATIENT
Start: 2018-05-03 | End: 2018-06-07

## 2018-06-07 ENCOUNTER — OFFICE VISIT (OUTPATIENT)
Dept: INTERNAL MEDICINE | Facility: CLINIC | Age: 83
End: 2018-06-07

## 2018-06-07 VITALS
HEIGHT: 60 IN | WEIGHT: 160 LBS | TEMPERATURE: 98 F | BODY MASS INDEX: 31.41 KG/M2 | HEART RATE: 70 BPM | OXYGEN SATURATION: 94 % | SYSTOLIC BLOOD PRESSURE: 128 MMHG | DIASTOLIC BLOOD PRESSURE: 70 MMHG

## 2018-06-07 DIAGNOSIS — H61.23 BILATERAL IMPACTED CERUMEN: ICD-10-CM

## 2018-06-07 DIAGNOSIS — M79.675 PAIN AND SWELLING OF TOE OF LEFT FOOT: ICD-10-CM

## 2018-06-07 DIAGNOSIS — R31.9 HEMATURIA, UNSPECIFIED TYPE: ICD-10-CM

## 2018-06-07 DIAGNOSIS — N30.01 ACUTE CYSTITIS WITH HEMATURIA: ICD-10-CM

## 2018-06-07 DIAGNOSIS — M79.89 PAIN AND SWELLING OF TOE OF LEFT FOOT: ICD-10-CM

## 2018-06-07 DIAGNOSIS — M54.32 SCIATICA OF LEFT SIDE: Primary | ICD-10-CM

## 2018-06-07 LAB
BILIRUB BLD-MCNC: NEGATIVE MG/DL
CLARITY, POC: CLEAR
COLOR UR: YELLOW
GLUCOSE UR STRIP-MCNC: NEGATIVE MG/DL
KETONES UR QL: NEGATIVE
LEUKOCYTE EST, POC: ABNORMAL
NITRITE UR-MCNC: NEGATIVE MG/ML
PH UR: 7 [PH] (ref 5–8)
PROT UR STRIP-MCNC: NEGATIVE MG/DL
RBC # UR STRIP: ABNORMAL /UL
SP GR UR: 1.01 (ref 1–1.03)
UROBILINOGEN UR QL: NORMAL

## 2018-06-07 PROCEDURE — 99214 OFFICE O/P EST MOD 30 MIN: CPT | Performed by: INTERNAL MEDICINE

## 2018-06-07 RX ORDER — AMITRIPTYLINE HYDROCHLORIDE 10 MG/1
10 TABLET, FILM COATED ORAL NIGHTLY
Qty: 90 TABLET | Refills: 3 | Status: SHIPPED | OUTPATIENT
Start: 2018-06-07 | End: 2019-04-22 | Stop reason: SDUPTHER

## 2018-06-07 NOTE — PROGRESS NOTES
Subjective   Jessy Eubanks is a 83 y.o. female.     History of Present Illness   She thinks she is doing better with the elavil and the nsaids cream  She is also having some left flank pain no burning on urination  No blood in the urine  She has some cont swelling in the left foot  She had a neg dopplar.    She does try to limit sodium    The following portions of the patient's history were reviewed and updated as appropriate: allergies, current medications, past medical history, past social history and problem list.  She is trying to limit calories and sugar    Review of Systems   All other systems reviewed and are negative.      Objective   Physical Exam   Constitutional: She is oriented to person, place, and time. She appears well-developed and well-nourished.   HENT:   Head: Normocephalic and atraumatic.   Right Ear: External ear normal.   Left Ear: External ear normal.   Mouth/Throat: Oropharynx is clear and moist.   Eyes: Conjunctivae and EOM are normal. Pupils are equal, round, and reactive to light.   Neck: Normal range of motion. No tracheal deviation present. No thyromegaly present.   Cardiovascular: Normal rate, regular rhythm, normal heart sounds and intact distal pulses.    Pulmonary/Chest: Effort normal and breath sounds normal.   Abdominal: Soft. Bowel sounds are normal. She exhibits no distension. There is no tenderness.   Musculoskeletal: Normal range of motion. She exhibits no edema or deformity.   Neurological: She is alert and oriented to person, place, and time.   Skin: Skin is warm and dry.   Psychiatric: She has a normal mood and affect. Her behavior is normal. Judgment and thought content normal.   Vitals reviewed.      Vitals:    06/07/18 1452   BP: 128/70   Pulse: 70   Temp: 98 °F (36.7 °C)   SpO2: 94%          Assessment/Plan   Jessy was seen today for back pain, leg pain and urinary tract infection.    Diagnoses and all orders for this visit:    Hematuria, unspecified type  -      Urine Culture - Urine, Urine, Clean Catch; Future  -     POCT urinalysis dipstick, automated  -     Urine Culture - Urine, Urine, Clean Catch    Sciatica of left side    Pain and swelling of toe of left foot    Acute cystitis with hematuria    1.  Acute cystitis-  She would like a repeat UA  2.  Left sided sciatica-  She is doing well with the nsaid cream and the elavil  3.  Swelling in the left foot-  minimall I have rec elevation and compression stocking  The doppler was neg  4.  Bilat cerumen impaction-- try dubrox and refer ENT

## 2018-06-11 LAB
BACTERIA UR CULT: ABNORMAL
BACTERIA UR CULT: ABNORMAL
OTHER ANTIBIOTIC SUSC ISLT: ABNORMAL

## 2018-06-12 RX ORDER — CIPROFLOXACIN 250 MG/1
250 TABLET, FILM COATED ORAL EVERY 12 HOURS SCHEDULED
Qty: 10 TABLET | Refills: 0 | Status: SHIPPED | OUTPATIENT
Start: 2018-06-12 | End: 2018-10-30

## 2018-07-16 RX ORDER — OMEPRAZOLE 20 MG/1
CAPSULE, DELAYED RELEASE ORAL
Qty: 90 CAPSULE | Refills: 1 | Status: SHIPPED | OUTPATIENT
Start: 2018-07-16 | End: 2019-01-24 | Stop reason: SDUPTHER

## 2018-10-15 RX ORDER — LEVOTHYROXINE SODIUM 0.1 MG/1
TABLET ORAL
Qty: 90 TABLET | Refills: 1 | Status: SHIPPED | OUTPATIENT
Start: 2018-10-15 | End: 2019-04-04 | Stop reason: SDUPTHER

## 2018-10-15 RX ORDER — POTASSIUM CHLORIDE 20 MEQ/1
TABLET, EXTENDED RELEASE ORAL
Qty: 90 TABLET | Refills: 1 | Status: SHIPPED | OUTPATIENT
Start: 2018-10-15 | End: 2019-04-25 | Stop reason: SDUPTHER

## 2018-10-19 DIAGNOSIS — E11.8 TYPE 2 DIABETES MELLITUS WITH COMPLICATION (HCC): ICD-10-CM

## 2018-10-19 DIAGNOSIS — M54.32 SCIATICA OF LEFT SIDE: ICD-10-CM

## 2018-10-19 DIAGNOSIS — I10 ESSENTIAL HYPERTENSION: ICD-10-CM

## 2018-10-22 LAB
ALBUMIN SERPL-MCNC: 3.5 G/DL (ref 3.5–5.2)
ALBUMIN/GLOB SERPL: 1.4 G/DL
ALP SERPL-CCNC: 81 U/L (ref 39–117)
ALT SERPL-CCNC: 29 U/L (ref 1–33)
AST SERPL-CCNC: 22 U/L (ref 1–32)
BASOPHILS # BLD AUTO: 0.06 10*3/MM3 (ref 0–0.2)
BASOPHILS NFR BLD AUTO: 1.2 % (ref 0–1.5)
BILIRUB SERPL-MCNC: 0.4 MG/DL (ref 0.1–1.2)
BUN SERPL-MCNC: 12 MG/DL (ref 8–23)
BUN/CREAT SERPL: 15.8 (ref 7–25)
CALCIUM SERPL-MCNC: 8.9 MG/DL (ref 8.6–10.5)
CHLORIDE SERPL-SCNC: 108 MMOL/L (ref 98–107)
CHOLEST SERPL-MCNC: 138 MG/DL (ref 0–200)
CO2 SERPL-SCNC: 29.5 MMOL/L (ref 22–29)
CREAT SERPL-MCNC: 0.76 MG/DL (ref 0.57–1)
EOSINOPHIL # BLD AUTO: 0.31 10*3/MM3 (ref 0–0.7)
EOSINOPHIL NFR BLD AUTO: 6 % (ref 0.3–6.2)
ERYTHROCYTE [DISTWIDTH] IN BLOOD BY AUTOMATED COUNT: 15.5 % (ref 11.7–13)
GLOBULIN SER CALC-MCNC: 2.5 GM/DL
GLUCOSE SERPL-MCNC: 145 MG/DL (ref 65–99)
HBA1C MFR BLD: 5.8 % (ref 4.8–5.6)
HCT VFR BLD AUTO: 40.6 % (ref 35.6–45.5)
HDLC SERPL-MCNC: 57 MG/DL (ref 40–60)
HGB BLD-MCNC: 12.6 G/DL (ref 11.9–15.5)
IMM GRANULOCYTES # BLD: 0.01 10*3/MM3 (ref 0–0.03)
IMM GRANULOCYTES NFR BLD: 0.2 % (ref 0–0.5)
LDLC SERPL CALC-MCNC: 66 MG/DL (ref 0–100)
LDLC/HDLC SERPL: 1.15 {RATIO}
LYMPHOCYTES # BLD AUTO: 1.83 10*3/MM3 (ref 0.9–4.8)
LYMPHOCYTES NFR BLD AUTO: 35.5 % (ref 19.6–45.3)
MCH RBC QN AUTO: 28.2 PG (ref 26.9–32)
MCHC RBC AUTO-ENTMCNC: 31 G/DL (ref 32.4–36.3)
MCV RBC AUTO: 90.8 FL (ref 80.5–98.2)
MONOCYTES # BLD AUTO: 0.36 10*3/MM3 (ref 0.2–1.2)
MONOCYTES NFR BLD AUTO: 7 % (ref 5–12)
NEUTROPHILS # BLD AUTO: 2.6 10*3/MM3 (ref 1.9–8.1)
NEUTROPHILS NFR BLD AUTO: 50.3 % (ref 42.7–76)
PLATELET # BLD AUTO: 228 10*3/MM3 (ref 140–500)
POTASSIUM SERPL-SCNC: 3.9 MMOL/L (ref 3.5–5.2)
PROT SERPL-MCNC: 6 G/DL (ref 6–8.5)
RBC # BLD AUTO: 4.47 10*6/MM3 (ref 3.9–5.2)
SODIUM SERPL-SCNC: 148 MMOL/L (ref 136–145)
TRIGL SERPL-MCNC: 76 MG/DL (ref 0–150)
TSH SERPL DL<=0.005 MIU/L-ACNC: 1.16 MIU/ML (ref 0.27–4.2)
VLDLC SERPL CALC-MCNC: 15.2 MG/DL (ref 5–40)
WBC # BLD AUTO: 5.16 10*3/MM3 (ref 4.5–10.7)

## 2018-10-30 ENCOUNTER — OFFICE VISIT (OUTPATIENT)
Dept: INTERNAL MEDICINE | Facility: CLINIC | Age: 83
End: 2018-10-30

## 2018-10-30 VITALS
HEIGHT: 60 IN | OXYGEN SATURATION: 95 % | WEIGHT: 156.1 LBS | BODY MASS INDEX: 30.65 KG/M2 | HEART RATE: 63 BPM | TEMPERATURE: 97.7 F | DIASTOLIC BLOOD PRESSURE: 72 MMHG | SYSTOLIC BLOOD PRESSURE: 112 MMHG

## 2018-10-30 DIAGNOSIS — Z00.00 MEDICARE ANNUAL WELLNESS VISIT, SUBSEQUENT: Primary | ICD-10-CM

## 2018-10-30 DIAGNOSIS — I10 ESSENTIAL HYPERTENSION: ICD-10-CM

## 2018-10-30 DIAGNOSIS — E78.5 HYPERLIPIDEMIA, UNSPECIFIED HYPERLIPIDEMIA TYPE: ICD-10-CM

## 2018-10-30 DIAGNOSIS — E03.8 OTHER SPECIFIED HYPOTHYROIDISM: ICD-10-CM

## 2018-10-30 DIAGNOSIS — E11.8 TYPE 2 DIABETES MELLITUS WITH COMPLICATION, UNSPECIFIED WHETHER LONG TERM INSULIN USE: ICD-10-CM

## 2018-10-30 DIAGNOSIS — K21.9 GASTROESOPHAGEAL REFLUX DISEASE, ESOPHAGITIS PRESENCE NOT SPECIFIED: ICD-10-CM

## 2018-10-30 PROBLEM — I50.22 CHRONIC SYSTOLIC CONGESTIVE HEART FAILURE: Status: ACTIVE | Noted: 2018-10-30

## 2018-10-30 PROCEDURE — 90662 IIV NO PRSV INCREASED AG IM: CPT | Performed by: INTERNAL MEDICINE

## 2018-10-30 PROCEDURE — G0439 PPPS, SUBSEQ VISIT: HCPCS | Performed by: INTERNAL MEDICINE

## 2018-10-30 PROCEDURE — G0008 ADMIN INFLUENZA VIRUS VAC: HCPCS | Performed by: INTERNAL MEDICINE

## 2018-10-30 PROCEDURE — 99214 OFFICE O/P EST MOD 30 MIN: CPT | Performed by: INTERNAL MEDICINE

## 2018-10-30 RX ORDER — CARVEDILOL 6.25 MG/1
1 TABLET ORAL 2 TIMES DAILY
COMMUNITY
Start: 2018-10-16

## 2018-10-30 RX ORDER — SACUBITRIL AND VALSARTAN 49; 51 MG/1; MG/1
1 TABLET, FILM COATED ORAL 2 TIMES DAILY
COMMUNITY
Start: 2018-10-16

## 2018-10-30 NOTE — PROGRESS NOTES
Subjective   Jessy Eubanks is a 84 y.o. female here today to f/u on HTN, hyperlipidemia, DM and hypothyroidism.  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        History of Present Illness   She says she is breathing well with her CHF.  She is off losartan and now on coreg and entresto and she is doing well  Pt has been taking cholesterol meds as prescribed.  No difficulties with myalgias.   Pt has been taking BP meds as prescribed without any problems.  No HA  No episodes of orthostasis.  Pt has been doing well with thyroid meds.  Taking as perscribed without any complications      The following portions of the patient's history were reviewed and updated as appropriate: allergies, current medications, past medical history, past social history and problem list.  She is eating well she does stay active she does have a recumbient bike      Review of Systems   All other systems reviewed and are negative.      Objective   Physical Exam   Constitutional: She is oriented to person, place, and time. She appears well-developed and well-nourished.   HENT:   Head: Normocephalic and atraumatic.   Right Ear: External ear normal.   Left Ear: External ear normal.   Mouth/Throat: Oropharynx is clear and moist.   Eyes: Pupils are equal, round, and reactive to light. Conjunctivae and EOM are normal.   Neck: Normal range of motion. No tracheal deviation present. No thyromegaly present.   Cardiovascular: Normal rate, regular rhythm, normal heart sounds and intact distal pulses.    Pulmonary/Chest: Effort normal and breath sounds normal.   Abdominal: Soft. Bowel sounds are normal. She exhibits no distension. There is no tenderness.   Musculoskeletal: Normal range of motion. She exhibits no edema or deformity.   Neurological: She is alert and oriented to person, place, and time.   Skin: Skin is warm and dry.   Psychiatric: She has a normal mood and affect. Her behavior is normal. Judgment and thought content normal.   Vitals  reviewed.      Vitals:    10/30/18 1413   BP: 112/72   Pulse: 63   Temp: 97.7 °F (36.5 °C)   SpO2: 95%        Orders Only on 10/19/2018   Component Date Value Ref Range Status   • WBC 10/22/2018 5.16  4.50 - 10.70 10*3/mm3 Final   • RBC 10/22/2018 4.47  3.90 - 5.20 10*6/mm3 Final   • Hemoglobin 10/22/2018 12.6  11.9 - 15.5 g/dL Final   • Hematocrit 10/22/2018 40.6  35.6 - 45.5 % Final   • MCV 10/22/2018 90.8  80.5 - 98.2 fL Final   • MCH 10/22/2018 28.2  26.9 - 32.0 pg Final   • MCHC 10/22/2018 31.0* 32.4 - 36.3 g/dL Final   • RDW 10/22/2018 15.5* 11.7 - 13.0 % Final   • Platelets 10/22/2018 228  140 - 500 10*3/mm3 Final   • Neutrophil Rel % 10/22/2018 50.3  42.7 - 76.0 % Final   • Lymphocyte Rel % 10/22/2018 35.5  19.6 - 45.3 % Final   • Monocyte Rel % 10/22/2018 7.0  5.0 - 12.0 % Final   • Eosinophil Rel % 10/22/2018 6.0  0.3 - 6.2 % Final   • Basophil Rel % 10/22/2018 1.2  0.0 - 1.5 % Final   • Neutrophils Absolute 10/22/2018 2.60  1.90 - 8.10 10*3/mm3 Final   • Lymphocytes Absolute 10/22/2018 1.83  0.90 - 4.80 10*3/mm3 Final   • Monocytes Absolute 10/22/2018 0.36  0.20 - 1.20 10*3/mm3 Final   • Eosinophils Absolute 10/22/2018 0.31  0.00 - 0.70 10*3/mm3 Final   • Basophils Absolute 10/22/2018 0.06  0.00 - 0.20 10*3/mm3 Final   • Immature Granulocyte Rel % 10/22/2018 0.2  0.0 - 0.5 % Final   • Immature Grans Absolute 10/22/2018 0.01  0.00 - 0.03 10*3/mm3 Final   • Glucose 10/22/2018 145* 65 - 99 mg/dL Final   • BUN 10/22/2018 12  8 - 23 mg/dL Final   • Creatinine 10/22/2018 0.76  0.57 - 1.00 mg/dL Final   • eGFR Non  Am 10/22/2018 73  >60 mL/min/1.73 Final    Comment: The MDRD GFR formula is only valid for adults with stable  renal function between ages 18 and 70.     • eGFR  Am 10/22/2018 88  >60 mL/min/1.73 Final   • BUN/Creatinine Ratio 10/22/2018 15.8  7.0 - 25.0 Final   • Sodium 10/22/2018 148* 136 - 145 mmol/L Final   • Potassium 10/22/2018 3.9  3.5 - 5.2 mmol/L Final   • Chloride 10/22/2018  108* 98 - 107 mmol/L Final   • Total CO2 10/22/2018 29.5* 22.0 - 29.0 mmol/L Final   • Calcium 10/22/2018 8.9  8.6 - 10.5 mg/dL Final   • Total Protein 10/22/2018 6.0  6.0 - 8.5 g/dL Final   • Albumin 10/22/2018 3.50  3.50 - 5.20 g/dL Final   • Globulin 10/22/2018 2.5  gm/dL Final   • A/G Ratio 10/22/2018 1.4  g/dL Final   • Total Bilirubin 10/22/2018 0.4  0.1 - 1.2 mg/dL Final   • Alkaline Phosphatase 10/22/2018 81  39 - 117 U/L Final   • AST (SGOT) 10/22/2018 22  1 - 32 U/L Final   • ALT (SGPT) 10/22/2018 29  1 - 33 U/L Final   • Hemoglobin A1C 10/22/2018 5.80* 4.80 - 5.60 % Final    Comment: Hemoglobin A1C Ranges:  Increased Risk for Diabetes  5.7% to 6.4%  Diabetes                     >= 6.5%  Diabetic Goal                < 7.0%     • Total Cholesterol 10/22/2018 138  0 - 200 mg/dL Final   • Triglycerides 10/22/2018 76  0 - 150 mg/dL Final   • HDL Cholesterol 10/22/2018 57  40 - 60 mg/dL Final   • VLDL Cholesterol 10/22/2018 15.2  5 - 40 mg/dL Final   • LDL Cholesterol  10/22/2018 66  0 - 100 mg/dL Final   • LDL/HDL Ratio 10/22/2018 1.15   Final   • TSH 10/22/2018 1.16  0.27 - 4.2 mIU/mL Final         Assessment/Plan   Diagnoses and all orders for this visit:    Medicare annual wellness visit, subsequent    Essential hypertension    Hyperlipidemia, unspecified hyperlipidemia type    Type 2 diabetes mellitus with complication, unspecified whether long term insulin use (CMS/formerly [facility])    Other specified hypothyroidism    Gastroesophageal reflux disease, esophagitis presence not specified        1.  HTN- ok with current meds  2.  HPL- ok with atorvastatin  3.  CHF-  Ok with current meds  4. Hypothyroidisim-  Ok with current dose  5.  GERD- ok with omeprazole

## 2018-10-30 NOTE — PROGRESS NOTES
QUICK REFERENCE INFORMATION:  The ABCs of the Annual Wellness Visit    Subsequent Medicare Wellness Visit    HEALTH RISK ASSESSMENT    1934    Recent Hospitalizations:  No hospitalization(s) within the last year..        Current Medical Providers:  Patient Care Team:  Any Carbajal MD as PCP - General  Guillermo Aguillon MD as PCP - Claims Attributed  Guillermo Aguillon MD as Consulting Physician (Cardiology)        Smoking Status:  History   Smoking Status   • Never Smoker   Smokeless Tobacco   • Not on file       Alcohol Consumption:  History   Alcohol Use No       Depression Screen:   PHQ-2/PHQ-9 Depression Screening 10/30/2018   Little interest or pleasure in doing things 0   Feeling down, depressed, or hopeless 0   Trouble falling or staying asleep, or sleeping too much 3   Feeling tired or having little energy 1   Poor appetite or overeating 0   Feeling bad about yourself - or that you are a failure or have let yourself or your family down 0   Trouble concentrating on things, such as reading the newspaper or watching television 0   Moving or speaking so slowly that other people could have noticed. Or the opposite - being so fidgety or restless that you have been moving around a lot more than usual 0   Thoughts that you would be better off dead, or of hurting yourself in some way 0   Total Score 4   If you checked off any problems, how difficult have these problems made it for you to do your work, take care of things at home, or get along with other people? Not difficult at all       Health Habits and Functional and Cognitive Screening:  Functional & Cognitive Status 10/30/2018   Do you have difficulty preparing food and eating? No   Do you have difficulty bathing yourself, getting dressed or grooming yourself? No   Do you have difficulty using the toilet? No   Do you have difficulty moving around from place to place? Yes   Do you have trouble with steps or getting out of a bed or a chair? Yes   In the  past year have you fallen or experienced a near fall? Yes   Current Diet Well Balanced Diet   Dental Exam Up to date   Eye Exam Up to date   Exercise (times per week) 6 times per week   Current Exercise Activities Include Stationary Bicycling/Spin Class   Do you need help using the phone?  No   Are you deaf or do you have serious difficulty hearing?  No   Do you need help with transportation? No   Do you need help shopping? No   Do you need help preparing meals?  No   Do you need help with housework?  Yes   Do you need help with laundry? Yes   Do you need help taking your medications? No   Do you need help managing money? No   Do you ever drive or ride in a car without wearing a seat belt? No   Have you felt unusual stress, anger or loneliness in the last month? No   Who do you live with? Spouse   If you need help, do you have trouble finding someone available to you? No   Have you been bothered in the last four weeks by sexual problems? No   Do you have difficulty concentrating, remembering or making decisions? No           Does the patient have evidence of cognitive impairment? No    Aspirin use counseling: Taking ASA appropriately as indicated      Recent Lab Results:  CMP:  Lab Results   Component Value Date     (H) 10/22/2018    BUN 12 10/22/2018    CREATININE 0.76 10/22/2018    EGFRIFNONA 73 10/22/2018    EGFRIFAFRI 88 10/22/2018    BCR 15.8 10/22/2018     (H) 10/22/2018    K 3.9 10/22/2018    CO2 29.5 (H) 10/22/2018    CALCIUM 8.9 10/22/2018    PROTENTOTREF 6.0 10/22/2018    ALBUMIN 3.50 10/22/2018    LABGLOBREF 2.5 10/22/2018    LABIL2 1.4 10/22/2018    BILITOT 0.4 10/22/2018    ALKPHOS 81 10/22/2018    AST 22 10/22/2018    ALT 29 10/22/2018     Lipid Panel:  Lab Results   Component Value Date    TRIG 76 10/22/2018    HDL 57 10/22/2018    VLDL 15.2 10/22/2018    LDLHDL 1.15 10/22/2018     HbA1c:  Lab Results   Component Value Date    HGBA1C 5.80 (H) 10/22/2018       Visual Acuity:  No exam data  present    Age-appropriate Screening Schedule:  Refer to the list below for future screening recommendations based on patient's age, sex and/or medical conditions. Orders for these recommended tests are listed in the plan section. The patient has been provided with a written plan.    Health Maintenance   Topic Date Due   • TDAP/TD VACCINES (1 - Tdap) 10/07/1953   • ZOSTER VACCINE (2 of 3) 02/26/2012   • INFLUENZA VACCINE  08/01/2018   • MAMMOGRAM  04/14/2019   • DXA SCAN  04/14/2019   • HEMOGLOBIN A1C  04/22/2019   • URINE MICROALBUMIN  04/27/2019   • LIPID PANEL  10/22/2019   • PNEUMOCOCCAL VACCINES (65+ LOW/MEDIUM RISK)  Completed        Subjective   History of Present Illness    Jessy Eubanks is a 84 y.o. female who presents for an Subsequent Wellness Visit.    The following portions of the patient's history were reviewed and updated as appropriate: allergies, current medications, past family history, past medical history, past social history, past surgical history and problem list.    Outpatient Medications Prior to Visit   Medication Sig Dispense Refill   • amitriptyline (ELAVIL) 10 MG tablet Take 1 tablet by mouth Every Night. 90 tablet 3   • atorvastatin (LIPITOR) 40 MG tablet TAKE 1 TABLET DAILY 90 tablet 1   • brimonidine-timolol (COMBIGAN) 0.2-0.5 % ophthalmic solution 1 drop Every 12 (Twelve) Hours.     • calcium (OS-ELIZABETH) 600 MG tablet Take  by mouth daily.     • Cholecalciferol (VITAMIN D3) 2000 UNITS tablet Take 1 capsule by mouth.     • Coenzyme Q10 (CO Q 10) 100 MG capsule Take 1 capsule by mouth Daily.     • levothyroxine (SYNTHROID, LEVOTHROID) 100 MCG tablet TAKE 1 TABLET DAILY 90 tablet 1   • naproxen sodium (ALEVE) 220 MG tablet Take 220 mg by mouth 2 (Two) Times a Day With Meals.     • Omega-3 Fatty Acids (OMEGA 3 PO) Take 1 capsule by mouth Daily.     • omeprazole (priLOSEC) 20 MG capsule TAKE 1 CAPSULE DAILY 90 capsule 1   • potassium chloride (K-DUR,KLOR-CON) 20 MEQ CR tablet TAKE 1  "TABLET DAILY 90 tablet 1   • aspirin 325 MG tablet Take  by mouth.     • ciprofloxacin (CIPRO) 250 MG tablet Take 1 tablet by mouth Every 12 (Twelve) Hours. 10 tablet 0   • losartan-hydrochlorothiazide (HYZAAR) 50-12.5 MG per tablet TAKE 1 TABLET TWICE A  tablet 1     No facility-administered medications prior to visit.        Patient Active Problem List   Diagnosis   • Diabetic peripheral neuropathy (CMS/HCC)   • Gastroesophageal reflux disease   • Hyperlipidemia   • Hypertension   • Hypothyroidism   • Restless legs syndrome   • Type 2 diabetes mellitus (CMS/[facility])   • Cobalamin deficiency   • Vitamin D deficiency   • Sciatica   • Hematuria   • Chronic systolic congestive heart failure (CMS/[facility])       Advance Care Planning:  has an advance directive - a copy has been provided and is in file    Identification of Risk Factors:  Risk factors include: cardiovascular risk and increased fall risk.    Review of Systems    Compared to one year ago, the patient feels her physical health is worse.due to worsening vision and balance  Compared to one year ago, the patient feels her mental health is the same.    Objective     Physical Exam    Vitals:    10/30/18 1413   BP: 112/72   BP Location: Left arm   Patient Position: Sitting   Pulse: 63   Temp: 97.7 °F (36.5 °C)   TempSrc: Oral   SpO2: 95%   Weight: 70.8 kg (156 lb 1.6 oz)   Height: 153 cm (60.24\")   PainSc:   6       Patient's Body mass index is 30.24 kg/m². BMI is within normal parameters. No follow-up required.      Assessment/Plan   Patient Self-Management and Personalized Health Advice  The patient has been provided with information about: diet, weight management, the relationship between weight and GERD and fall prevention and preventive services including:   · Exercise counseling provided, Nutrition counseling provided.    Visit Diagnoses:    ICD-10-CM ICD-9-CM   1. Medicare annual wellness visit, subsequent Z00.00 V70.0   2. Essential hypertension I10 401.9 "   3. Hyperlipidemia, unspecified hyperlipidemia type E78.5 272.4   4. Type 2 diabetes mellitus with complication, unspecified whether long term insulin use (CMS/[facility]) E11.8 250.90   5. Other specified hypothyroidism E03.8 244.8   6. Gastroesophageal reflux disease, esophagitis presence not specified K21.9 530.81       No orders of the defined types were placed in this encounter.      Outpatient Encounter Prescriptions as of 10/30/2018   Medication Sig Dispense Refill   • amitriptyline (ELAVIL) 10 MG tablet Take 1 tablet by mouth Every Night. 90 tablet 3   • atorvastatin (LIPITOR) 40 MG tablet TAKE 1 TABLET DAILY 90 tablet 1   • brimonidine-timolol (COMBIGAN) 0.2-0.5 % ophthalmic solution 1 drop Every 12 (Twelve) Hours.     • calcium (OS-ELIZABETH) 600 MG tablet Take  by mouth daily.     • carvedilol (COREG) 6.25 MG tablet Take 1 tablet by mouth 2 (Two) Times a Day.     • Cholecalciferol (VITAMIN D3) 2000 UNITS tablet Take 1 capsule by mouth.     • Coenzyme Q10 (CO Q 10) 100 MG capsule Take 1 capsule by mouth Daily.     • ENTRESTO 49-51 MG tablet Take 1 tablet by mouth 2 (Two) Times a Day.     • levothyroxine (SYNTHROID, LEVOTHROID) 100 MCG tablet TAKE 1 TABLET DAILY 90 tablet 1   • naproxen sodium (ALEVE) 220 MG tablet Take 220 mg by mouth 2 (Two) Times a Day With Meals.     • Omega-3 Fatty Acids (OMEGA 3 PO) Take 1 capsule by mouth Daily.     • omeprazole (priLOSEC) 20 MG capsule TAKE 1 CAPSULE DAILY 90 capsule 1   • potassium chloride (K-DUR,KLOR-CON) 20 MEQ CR tablet TAKE 1 TABLET DAILY 90 tablet 1   • aspirin 325 MG tablet Take  by mouth.     • [DISCONTINUED] ciprofloxacin (CIPRO) 250 MG tablet Take 1 tablet by mouth Every 12 (Twelve) Hours. 10 tablet 0   • [DISCONTINUED] losartan-hydrochlorothiazide (HYZAAR) 50-12.5 MG per tablet TAKE 1 TABLET TWICE A  tablet 1     No facility-administered encounter medications on file as of 10/30/2018.        Reviewed use of high risk medication in the elderly: yes  Reviewed  for potential of harmful drug interactions in the elderly: yes    Follow Up:  No Follow-up on file.     An After Visit Summary and PPPS with all of these plans were given to the patient.    She does work on balance exercises  She is cautious on steps

## 2018-10-30 NOTE — PATIENT INSTRUCTIONS
Fall Prevention in the Home  Falls can cause injuries. They can happen to people of all ages. There are many things you can do to make your home safe and to help prevent falls.  What can I do on the outside of my home?  · Regularly fix the edges of walkways and driveways and fix any cracks.  · Remove anything that might make you trip as you walk through a door, such as a raised step or threshold.  · Trim any bushes or trees on the path to your home.  · Use bright outdoor lighting.  · Clear any walking paths of anything that might make someone trip, such as rocks or tools.  · Regularly check to see if handrails are loose or broken. Make sure that both sides of any steps have handrails.  · Any raised decks and porches should have guardrails on the edges.  · Have any leaves, snow, or ice cleared regularly.  · Use sand or salt on walking paths during winter.  · Clean up any spills in your garage right away. This includes oil or grease spills.  What can I do in the bathroom?  · Use night lights.  · Install grab bars by the toilet and in the tub and shower. Do not use towel bars as grab bars.  · Use non-skid mats or decals in the tub or shower.  · If you need to sit down in the shower, use a plastic, non-slip stool.  · Keep the floor dry. Clean up any water that spills on the floor as soon as it happens.  · Remove soap buildup in the tub or shower regularly.  · Attach bath mats securely with double-sided non-slip rug tape.  · Do not have throw rugs and other things on the floor that can make you trip.  What can I do in the bedroom?  · Use night lights.  · Make sure that you have a light by your bed that is easy to reach.  · Do not use any sheets or blankets that are too big for your bed. They should not hang down onto the floor.  · Have a firm chair that has side arms. You can use this for support while you get dressed.  · Do not have throw rugs and other things on the floor that can make you trip.  What can I do in the  kitchen?  · Clean up any spills right away.  · Avoid walking on wet floors.  · Keep items that you use a lot in easy-to-reach places.  · If you need to reach something above you, use a strong step stool that has a grab bar.  · Keep electrical cords out of the way.  · Do not use floor polish or wax that makes floors slippery. If you must use wax, use non-skid floor wax.  · Do not have throw rugs and other things on the floor that can make you trip.  What can I do with my stairs?  · Do not leave any items on the stairs.  · Make sure that there are handrails on both sides of the stairs and use them. Fix handrails that are broken or loose. Make sure that handrails are as long as the stairways.  · Check any carpeting to make sure that it is firmly attached to the stairs. Fix any carpet that is loose or worn.  · Avoid having throw rugs at the top or bottom of the stairs. If you do have throw rugs, attach them to the floor with carpet tape.  · Make sure that you have a light switch at the top of the stairs and the bottom of the stairs. If you do not have them, ask someone to add them for you.  What else can I do to help prevent falls?  · Wear shoes that:  ? Do not have high heels.  ? Have rubber bottoms.  ? Are comfortable and fit you well.  ? Are closed at the toe. Do not wear sandals.  · If you use a stepladder:  ? Make sure that it is fully opened. Do not climb a closed stepladder.  ? Make sure that both sides of the stepladder are locked into place.  ? Ask someone to hold it for you, if possible.  · Clearly riky and make sure that you can see:  ? Any grab bars or handrails.  ? First and last steps.  ? Where the edge of each step is.  · Use tools that help you move around (mobility aids) if they are needed. These include:  ? Canes.  ? Walkers.  ? Scooters.  ? Crutches.  · Turn on the lights when you go into a dark area. Replace any light bulbs as soon as they burn out.  · Set up your furniture so you have a clear path.  Avoid moving your furniture around.  · If any of your floors are uneven, fix them.  · If there are any pets around you, be aware of where they are.  · Review your medicines with your doctor. Some medicines can make you feel dizzy. This can increase your chance of falling.  Ask your doctor what other things that you can do to help prevent falls.  This information is not intended to replace advice given to you by your health care provider. Make sure you discuss any questions you have with your health care provider.  Document Released: 10/14/2010 Document Revised: 2017 Document Reviewed: 2016  [employer] Interactive Patient Education © 2018 [employer]    Medicare Wellness  Personal Prevention Plan of Service     Date of Office Visit:  10/30/2018  Encounter Provider:  Any Carbajal MD  Place of Service:  [facility]  Patient Name: Jessy Eubanks  :  1934    As part of the Medicare Wellness portion of your visit today, we are providing you with this personalized preventive plan of services (PPPS). This plan is based upon recommendations of the [place] Preventive Services Task Force (USPSTF) and the Advisory Committee on Immunization Practices (ACIP).    This lists the preventive care services that should be considered, and provides dates of when you are due. Items listed as completed are up-to-date and do not require any further intervention.    Health Maintenance   Topic Date Due   • TDAP/TD VACCINES (1 - Tdap) 10/07/1953   • ZOSTER VACCINE (2 of 3) 2012   • INFLUENZA VACCINE  2018   • MEDICARE ANNUAL WELLNESS  10/13/2018   • MAMMOGRAM  2019   • DXA SCAN  2019   • HEMOGLOBIN A1C  2019   • URINE MICROALBUMIN  2019   • LIPID PANEL  10/22/2019   • PNEUMOCOCCAL VACCINES (65+ LOW/MEDIUM RISK)  Completed       No orders of the defined types were placed in this encounter.      No Follow-up on file.

## 2018-11-19 RX ORDER — ATORVASTATIN CALCIUM 40 MG/1
TABLET, FILM COATED ORAL
Qty: 90 TABLET | Refills: 1 | Status: SHIPPED | OUTPATIENT
Start: 2018-11-19 | End: 2019-04-25 | Stop reason: SDUPTHER

## 2019-01-24 RX ORDER — OMEPRAZOLE 20 MG/1
CAPSULE, DELAYED RELEASE ORAL
Qty: 90 CAPSULE | Refills: 1 | Status: SHIPPED | OUTPATIENT
Start: 2019-01-24 | End: 2019-07-01 | Stop reason: SDUPTHER

## 2019-04-04 RX ORDER — LEVOTHYROXINE SODIUM 0.1 MG/1
TABLET ORAL
Qty: 90 TABLET | Refills: 1 | Status: SHIPPED | OUTPATIENT
Start: 2019-04-04 | End: 2019-09-10 | Stop reason: SDUPTHER

## 2019-04-22 RX ORDER — AMITRIPTYLINE HYDROCHLORIDE 10 MG/1
TABLET, FILM COATED ORAL
Qty: 90 TABLET | Refills: 3 | Status: SHIPPED | OUTPATIENT
Start: 2019-04-22 | End: 2020-05-22 | Stop reason: SDUPTHER

## 2019-04-25 RX ORDER — POTASSIUM CHLORIDE 20 MEQ/1
TABLET, EXTENDED RELEASE ORAL
Qty: 90 TABLET | Refills: 1 | Status: SHIPPED | OUTPATIENT
Start: 2019-04-25 | End: 2019-10-06 | Stop reason: SDUPTHER

## 2019-04-25 RX ORDER — ATORVASTATIN CALCIUM 40 MG/1
TABLET, FILM COATED ORAL
Qty: 90 TABLET | Refills: 1 | Status: SHIPPED | OUTPATIENT
Start: 2019-04-25 | End: 2019-10-06 | Stop reason: SDUPTHER

## 2019-04-30 ENCOUNTER — RESULTS ENCOUNTER (OUTPATIENT)
Dept: INTERNAL MEDICINE | Facility: CLINIC | Age: 84
End: 2019-04-30

## 2019-04-30 DIAGNOSIS — E03.8 OTHER SPECIFIED HYPOTHYROIDISM: ICD-10-CM

## 2019-04-30 DIAGNOSIS — I10 ESSENTIAL HYPERTENSION: ICD-10-CM

## 2019-04-30 DIAGNOSIS — E78.5 HYPERLIPIDEMIA, UNSPECIFIED HYPERLIPIDEMIA TYPE: ICD-10-CM

## 2019-04-30 DIAGNOSIS — E11.8 TYPE 2 DIABETES MELLITUS WITH COMPLICATION, UNSPECIFIED WHETHER LONG TERM INSULIN USE: ICD-10-CM

## 2019-05-06 LAB
ALBUMIN SERPL-MCNC: 4.1 G/DL (ref 3.5–5.2)
ALBUMIN/GLOB SERPL: 1.8 G/DL
ALP SERPL-CCNC: 62 U/L (ref 39–117)
ALT SERPL-CCNC: 20 U/L (ref 1–33)
AST SERPL-CCNC: 21 U/L (ref 1–32)
BASOPHILS # BLD AUTO: 0.07 10*3/MM3 (ref 0–0.2)
BASOPHILS NFR BLD AUTO: 1.5 % (ref 0–1.5)
BILIRUB SERPL-MCNC: 0.4 MG/DL (ref 0.2–1.2)
BUN SERPL-MCNC: 16 MG/DL (ref 8–23)
BUN/CREAT SERPL: 21.9 (ref 7–25)
CALCIUM SERPL-MCNC: 9.5 MG/DL (ref 8.6–10.5)
CHLORIDE SERPL-SCNC: 108 MMOL/L (ref 98–107)
CHOLEST SERPL-MCNC: 150 MG/DL (ref 0–200)
CO2 SERPL-SCNC: 31.6 MMOL/L (ref 22–29)
CREAT SERPL-MCNC: 0.73 MG/DL (ref 0.57–1)
EOSINOPHIL # BLD AUTO: 0.23 10*3/MM3 (ref 0–0.4)
EOSINOPHIL NFR BLD AUTO: 4.9 % (ref 0.3–6.2)
ERYTHROCYTE [DISTWIDTH] IN BLOOD BY AUTOMATED COUNT: 15.6 % (ref 12.3–15.4)
GLOBULIN SER CALC-MCNC: 2.3 GM/DL
GLUCOSE SERPL-MCNC: 127 MG/DL (ref 65–99)
HBA1C MFR BLD: 6 % (ref 4.8–5.6)
HCT VFR BLD AUTO: 40.7 % (ref 34–46.6)
HDLC SERPL-MCNC: 63 MG/DL (ref 40–60)
HGB BLD-MCNC: 11.9 G/DL (ref 12–15.9)
IMM GRANULOCYTES # BLD AUTO: 0.01 10*3/MM3 (ref 0–0.05)
IMM GRANULOCYTES NFR BLD AUTO: 0.2 % (ref 0–0.5)
LDLC SERPL CALC-MCNC: 74 MG/DL (ref 0–100)
LDLC/HDLC SERPL: 1.17 {RATIO}
LYMPHOCYTES # BLD AUTO: 1.86 10*3/MM3 (ref 0.7–3.1)
LYMPHOCYTES NFR BLD AUTO: 39.2 % (ref 19.6–45.3)
MCH RBC QN AUTO: 28.4 PG (ref 26.6–33)
MCHC RBC AUTO-ENTMCNC: 29.2 G/DL (ref 31.5–35.7)
MCV RBC AUTO: 97.1 FL (ref 79–97)
MONOCYTES # BLD AUTO: 0.39 10*3/MM3 (ref 0.1–0.9)
MONOCYTES NFR BLD AUTO: 8.2 % (ref 5–12)
NEUTROPHILS # BLD AUTO: 2.18 10*3/MM3 (ref 1.7–7)
NEUTROPHILS NFR BLD AUTO: 46 % (ref 42.7–76)
NRBC BLD AUTO-RTO: 0.2 /100 WBC (ref 0–0.2)
PLATELET # BLD AUTO: 246 10*3/MM3 (ref 140–450)
POTASSIUM SERPL-SCNC: 5.1 MMOL/L (ref 3.5–5.2)
PROT SERPL-MCNC: 6.4 G/DL (ref 6–8.5)
RBC # BLD AUTO: 4.19 10*6/MM3 (ref 3.77–5.28)
SODIUM SERPL-SCNC: 147 MMOL/L (ref 136–145)
TRIGL SERPL-MCNC: 66 MG/DL (ref 0–150)
TSH SERPL DL<=0.005 MIU/L-ACNC: 1.68 MIU/ML (ref 0.27–4.2)
VLDLC SERPL CALC-MCNC: 13.2 MG/DL
WBC # BLD AUTO: 4.74 10*3/MM3 (ref 3.4–10.8)

## 2019-05-09 ENCOUNTER — OFFICE VISIT (OUTPATIENT)
Dept: INTERNAL MEDICINE | Facility: CLINIC | Age: 84
End: 2019-05-09

## 2019-05-09 ENCOUNTER — HOSPITAL ENCOUNTER (OUTPATIENT)
Dept: GENERAL RADIOLOGY | Facility: HOSPITAL | Age: 84
Discharge: HOME | End: 2019-05-09
Admitting: INTERNAL MEDICINE

## 2019-05-09 VITALS
DIASTOLIC BLOOD PRESSURE: 64 MMHG | HEIGHT: 60 IN | HEART RATE: 63 BPM | TEMPERATURE: 97.9 F | SYSTOLIC BLOOD PRESSURE: 124 MMHG | OXYGEN SATURATION: 94 % | BODY MASS INDEX: 29.49 KG/M2 | WEIGHT: 150.2 LBS

## 2019-05-09 DIAGNOSIS — I10 ESSENTIAL HYPERTENSION: Primary | ICD-10-CM

## 2019-05-09 DIAGNOSIS — E11.8 TYPE 2 DIABETES MELLITUS WITH COMPLICATION, UNSPECIFIED WHETHER LONG TERM INSULIN USE: ICD-10-CM

## 2019-05-09 DIAGNOSIS — G89.29 CHRONIC LEFT-SIDED LOW BACK PAIN WITH LEFT-SIDED SCIATICA: ICD-10-CM

## 2019-05-09 DIAGNOSIS — M54.42 CHRONIC LEFT-SIDED LOW BACK PAIN WITH LEFT-SIDED SCIATICA: ICD-10-CM

## 2019-05-09 DIAGNOSIS — E78.5 HYPERLIPIDEMIA, UNSPECIFIED HYPERLIPIDEMIA TYPE: ICD-10-CM

## 2019-05-09 DIAGNOSIS — E03.8 OTHER SPECIFIED HYPOTHYROIDISM: ICD-10-CM

## 2019-05-09 PROCEDURE — 72100 X-RAY EXAM L-S SPINE 2/3 VWS: CPT

## 2019-05-09 PROCEDURE — 99397 PER PM REEVAL EST PAT 65+ YR: CPT | Performed by: INTERNAL MEDICINE

## 2019-05-09 RX ORDER — BRINZOLAMIDE/BRIMONIDINE TARTRATE 10; 2 MG/ML; MG/ML
1 SUSPENSION/ DROPS OPHTHALMIC 3 TIMES DAILY
COMMUNITY
Start: 2019-02-10 | End: 2022-02-28

## 2019-05-09 NOTE — PROGRESS NOTES
Subjective   Jessy Eubanks is a 84 y.o. female and is here for a comprehensive physical exam. The patient reports problems - htn.  Pt has been taking BP meds as prescribed without any problems.  No HA  No episodes of orthostasis  Pt has been taking cholesterol meds as prescribed.  No difficulties with myalgias.   Pt has been doing well with thyroid meds.  Taking as perscribed without any complications  Pt has been compliant with meds for GERD.  No sx as long as pt takes medicine as prescribed.  No epigastric pain or reflux sx    Pt is UTD with annual gyn exam and mammo declines    Do you take any herbs or supplements that were not prescribed by a doctor? See list      Social History: she is doing well with staying active    She uses the exercise bike at home    Social History     Socioeconomic History   • Marital status:      Spouse name: Theodore Eubanks   • Number of children: 3   • Years of education: Not on file   • Highest education level: Not on file   Occupational History   • Occupation:    Tobacco Use   • Smoking status: Never Smoker   Substance and Sexual Activity   • Alcohol use: No   • Drug use: No   Social History Narrative    SHe has several grandchildren and great grandchildren here       Family History:   Family History   Problem Relation Age of Onset   • Hypertension Mother    • Thyroid disease Mother    • Stroke Maternal Grandfather    • Colon cancer Daughter        Past Medical History:   Past Medical History:   Diagnosis Date   • Abnormal mammogram    • Carotid artery stenosis    • CHF (congestive heart failure) (CMS/HCC)    • Diabetes mellitus (CMS/HCC)    • GERD (gastroesophageal reflux disease)    • HLD (hyperlipidemia)    • HTN (hypertension)    • Hypertension    • Hypokalemia    • Hypothyroidism    • Osteopenia    • Postmenopausal    • RLS (restless legs syndrome)    • Sciatica    • Trigger finger    • Type 2 diabetes mellitus (CMS/HCC)    • Vitamin B12 deficiency    •  "Vitamin D deficiency            Review of Systems    A comprehensive review of systems was negative.    Objective   /64 (BP Location: Left arm, Patient Position: Sitting, Cuff Size: Adult)   Pulse 63   Temp 97.9 °F (36.6 °C) (Oral)   Ht 153 cm (60.24\")   Wt 68.1 kg (150 lb 3.2 oz)   SpO2 94%   BMI 29.10 kg/m²     General Appearance:    Alert, cooperative, no distress, appears stated age   Head:    Normocephalic, without obvious abnormality, atraumatic   Eyes:    PERRL, conjunctiva/corneas clear, EOM's intact, fundi     benign, both eyes   Ears:    Normal TM's and external ear canals, both ears   Nose:   Nares normal, septum midline, mucosa normal, no drainage    or sinus tenderness   Throat:   Lips, mucosa, and tongue normal; teeth and gums normal   Neck:   Supple, symmetrical, trachea midline, no adenopathy;     thyroid:  no enlargement/tenderness/nodules; no carotid    bruit or JVD   Back:     Symmetric, no curvature, ROM normal, no CVA tenderness   Lungs:     Clear to auscultation bilaterally, respirations unlabored   Chest Wall:    No tenderness or deformity    Heart:    Regular rate and rhythm, S1 and S2 normal, no murmur, rub   or gallop   Breast Exam:    No tenderness, masses, or nipple abnormality   Abdomen:     Soft, non-tender, bowel sounds active all four quadrants,     no masses, no organomegaly   Genitalia:    Normal female without lesion, discharge or tenderness   Rectal:    Normal tone, no masses or tenderness; guaiac negative stool   Extremities:   Extremities normal, atraumatic, no cyanosis or edema   Pulses:   2+ and symmetric all extremities   Skin:   Skin color, texture, turgor normal, no rashes or lesions   Lymph nodes:   Cervical, supraclavicular, and axillary nodes normal   Neurologic:   CNII-XII intact, normal strength, sensation and reflexes     throughout       Medications:   Current Outpatient Medications:   •  amitriptyline (ELAVIL) 10 MG tablet, TAKE 1 TABLET EVERY NIGHT, " Disp: 90 tablet, Rfl: 3  •  atorvastatin (LIPITOR) 40 MG tablet, TAKE 1 TABLET DAILY, Disp: 90 tablet, Rfl: 1  •  calcium (OS-ELIZABETH) 600 MG tablet, Take  by mouth daily., Disp: , Rfl:   •  carvedilol (COREG) 6.25 MG tablet, Take 1 tablet by mouth 2 (Two) Times a Day., Disp: , Rfl:   •  Cholecalciferol (VITAMIN D3) 2000 UNITS tablet, Take 1 capsule by mouth., Disp: , Rfl:   •  Coenzyme Q10 (CO Q 10) 100 MG capsule, Take 1 capsule by mouth Daily., Disp: , Rfl:   •  ENTRESTO 49-51 MG tablet, Take 1 tablet by mouth 2 (Two) Times a Day., Disp: , Rfl:   •  levothyroxine (SYNTHROID, LEVOTHROID) 100 MCG tablet, TAKE 1 TABLET DAILY, Disp: 90 tablet, Rfl: 1  •  naproxen sodium (ALEVE) 220 MG tablet, Take 220 mg by mouth 2 (Two) Times a Day With Meals., Disp: , Rfl:   •  omeprazole (priLOSEC) 20 MG capsule, TAKE 1 CAPSULE DAILY, Disp: 90 capsule, Rfl: 1  •  potassium chloride (K-DUR,KLOR-CON) 20 MEQ CR tablet, TAKE 1 TABLET DAILY, Disp: 90 tablet, Rfl: 1  •  SIMBRINZA 1-0.2 % suspension, , Disp: , Rfl:        Assessment/Plan   Healthy female exam.      1. Healthcare Maintenance:  2. Patient Counseling:  --Nutrition: Stressed importance of moderation in sodium/caffeine intake, saturated fat and cholesterol, caloric balance, sufficient intake of fresh fruits, vegetables, fiber, calcium and vit D  --Exercise: she does exercise occas with the bike  --Substance Abuse: no tob no etoh   --Dental health: she does go to the dentist reg  --Immunizations reviewed.  I rec the shingles vaccine  --Discussed benefits of screening colonoscopy.  3.  Hypothyroidism- ok with current dose  4.  HPL- ok with current meds  5.  DM- well controlled with diet  6.  HTN- ok with current meds  7.  Left sided back pain with sciatica-  Would like to see husbands PT

## 2019-05-09 NOTE — PROGRESS NOTES
Subjective   Jessy Eubanks is a 84 y.o. female here to follow up on labs.    History of Present Illness   Pt has been taking BP meds as prescribed without any problems.  No HA  No episodes of orthostasis  Pt has been taking cholesterol meds as prescribed.  No difficulties with myalgias.   Pt has been doing well with thyroid meds.  Taking as perscribed without any complications    The following portions of the patient's history were reviewed and updated as appropriate: allergies, current medications, past medical history, past social history and problem list.    Review of Systems    Objective   Physical Exam    Vitals:    05/09/19 1403   BP: 124/64   Pulse: 63   Temp: 97.9 °F (36.6 °C)   SpO2: 94%     Results Encounter on 04/30/2019   Component Date Value Ref Range Status   • Glucose 05/06/2019 127* 65 - 99 mg/dL Final   • BUN 05/06/2019 16  8 - 23 mg/dL Final   • Creatinine 05/06/2019 0.73  0.57 - 1.00 mg/dL Final   • eGFR Non African Am 05/06/2019 76  >60 mL/min/1.73 Final    Comment: The MDRD GFR formula is only valid for adults with stable  renal function between ages 18 and 70.     • eGFR  Am 05/06/2019 92  >60 mL/min/1.73 Final   • BUN/Creatinine Ratio 05/06/2019 21.9  7.0 - 25.0 Final   • Sodium 05/06/2019 147* 136 - 145 mmol/L Final   • Potassium 05/06/2019 5.1  3.5 - 5.2 mmol/L Final   • Chloride 05/06/2019 108* 98 - 107 mmol/L Final   • Total CO2 05/06/2019 31.6* 22.0 - 29.0 mmol/L Final   • Calcium 05/06/2019 9.5  8.6 - 10.5 mg/dL Final   • Total Protein 05/06/2019 6.4  6.0 - 8.5 g/dL Final   • Albumin 05/06/2019 4.10  3.50 - 5.20 g/dL Final   • Globulin 05/06/2019 2.3  gm/dL Final   • A/G Ratio 05/06/2019 1.8  g/dL Final   • Total Bilirubin 05/06/2019 0.4  0.2 - 1.2 mg/dL Final   • Alkaline Phosphatase 05/06/2019 62  39 - 117 U/L Final   • AST (SGOT) 05/06/2019 21  1 - 32 U/L Final   • ALT (SGPT) 05/06/2019 20  1 - 33 U/L Final   • Hemoglobin A1C 05/06/2019 6.00* 4.80 - 5.60 % Final    Comment:  Hemoglobin A1C Ranges:  Increased Risk for Diabetes  5.7% to 6.4%  Diabetes                     >= 6.5%  Diabetic Goal                < 7.0%     • Total Cholesterol 05/06/2019 150  0 - 200 mg/dL Final   • Triglycerides 05/06/2019 66  0 - 150 mg/dL Final   • HDL Cholesterol 05/06/2019 63* 40 - 60 mg/dL Final   • VLDL Cholesterol 05/06/2019 13.2  mg/dL Final   • LDL Cholesterol  05/06/2019 74  0 - 100 mg/dL Final   • LDL/HDL Ratio 05/06/2019 1.17   Final   • TSH 05/06/2019 1.680  0.270 - 4.200 mIU/mL Final   • WBC 05/06/2019 4.74  3.40 - 10.80 10*3/mm3 Final   • RBC 05/06/2019 4.19  3.77 - 5.28 10*6/mm3 Final   • Hemoglobin 05/06/2019 11.9* 12.0 - 15.9 g/dL Final   • Hematocrit 05/06/2019 40.7  34.0 - 46.6 % Final   • MCV 05/06/2019 97.1* 79.0 - 97.0 fL Final   • MCH 05/06/2019 28.4  26.6 - 33.0 pg Final   • MCHC 05/06/2019 29.2* 31.5 - 35.7 g/dL Final   • RDW 05/06/2019 15.6* 12.3 - 15.4 % Final   • Platelets 05/06/2019 246  140 - 450 10*3/mm3 Final   • Neutrophil Rel % 05/06/2019 46.0  42.7 - 76.0 % Final   • Lymphocyte Rel % 05/06/2019 39.2  19.6 - 45.3 % Final   • Monocyte Rel % 05/06/2019 8.2  5.0 - 12.0 % Final   • Eosinophil Rel % 05/06/2019 4.9  0.3 - 6.2 % Final   • Basophil Rel % 05/06/2019 1.5  0.0 - 1.5 % Final   • Neutrophils Absolute 05/06/2019 2.18  1.70 - 7.00 10*3/mm3 Final   • Lymphocytes Absolute 05/06/2019 1.86  0.70 - 3.10 10*3/mm3 Final   • Monocytes Absolute 05/06/2019 0.39  0.10 - 0.90 10*3/mm3 Final   • Eosinophils Absolute 05/06/2019 0.23  0.00 - 0.40 10*3/mm3 Final   • Basophils Absolute 05/06/2019 0.07  0.00 - 0.20 10*3/mm3 Final   • Immature Granulocyte Rel % 05/06/2019 0.2  0.0 - 0.5 % Final   • Immature Grans Absolute 05/06/2019 0.01  0.00 - 0.05 10*3/mm3 Final   • nRBC 05/06/2019 0.2  0.0 - 0.2 /100 WBC Final     Current Outpatient Medications:   •  amitriptyline (ELAVIL) 10 MG tablet, TAKE 1 TABLET EVERY NIGHT, Disp: 90 tablet, Rfl: 3  •  atorvastatin (LIPITOR) 40 MG tablet, TAKE 1  TABLET DAILY, Disp: 90 tablet, Rfl: 1  •  calcium (OS-ELIZABETH) 600 MG tablet, Take  by mouth daily., Disp: , Rfl:   •  carvedilol (COREG) 6.25 MG tablet, Take 1 tablet by mouth 2 (Two) Times a Day., Disp: , Rfl:   •  Cholecalciferol (VITAMIN D3) 2000 UNITS tablet, Take 1 capsule by mouth., Disp: , Rfl:   •  Coenzyme Q10 (CO Q 10) 100 MG capsule, Take 1 capsule by mouth Daily., Disp: , Rfl:   •  ENTRESTO 49-51 MG tablet, Take 1 tablet by mouth 2 (Two) Times a Day., Disp: , Rfl:   •  levothyroxine (SYNTHROID, LEVOTHROID) 100 MCG tablet, TAKE 1 TABLET DAILY, Disp: 90 tablet, Rfl: 1  •  naproxen sodium (ALEVE) 220 MG tablet, Take 220 mg by mouth 2 (Two) Times a Day With Meals., Disp: , Rfl:   •  omeprazole (priLOSEC) 20 MG capsule, TAKE 1 CAPSULE DAILY, Disp: 90 capsule, Rfl: 1  •  potassium chloride (K-DUR,KLOR-CON) 20 MEQ CR tablet, TAKE 1 TABLET DAILY, Disp: 90 tablet, Rfl: 1  •  SIMBRINZA 1-0.2 % suspension, , Disp: , Rfl:            Assessment/Plan   Diagnoses and all orders for this visit:    Essential hypertension    Hyperlipidemia, unspecified hyperlipidemia type    Type 2 diabetes mellitus with complication, unspecified whether long term insulin use (CMS/[facility])    Other specified hypothyroidism      1.  HTN- ok with current meds  2.  HPL- ok with atorvastatin  3.  DM- ok with diet  4.  Hypothyroidism-  Ok with current dose

## 2019-05-23 ENCOUNTER — TREATMENT (OUTPATIENT)
Dept: PHYSICAL THERAPY | Facility: CLINIC | Age: 84
End: 2019-05-23

## 2019-05-23 DIAGNOSIS — G89.29 CHRONIC LEFT-SIDED LOW BACK PAIN WITH LEFT-SIDED SCIATICA: Primary | ICD-10-CM

## 2019-05-23 DIAGNOSIS — M54.30 SCIATICA, UNSPECIFIED LATERALITY: ICD-10-CM

## 2019-05-23 DIAGNOSIS — M54.42 CHRONIC LEFT-SIDED LOW BACK PAIN WITH LEFT-SIDED SCIATICA: Primary | ICD-10-CM

## 2019-05-23 PROCEDURE — 97530 THERAPEUTIC ACTIVITIES: CPT | Performed by: PHYSICAL THERAPIST

## 2019-05-23 PROCEDURE — 97162 PT EVAL MOD COMPLEX 30 MIN: CPT | Performed by: PHYSICAL THERAPIST

## 2019-05-23 PROCEDURE — 97140 MANUAL THERAPY 1/> REGIONS: CPT | Performed by: PHYSICAL THERAPIST

## 2019-05-23 PROCEDURE — G0283 ELEC STIM OTHER THAN WOUND: HCPCS | Performed by: PHYSICAL THERAPIST

## 2019-05-23 PROCEDURE — 97110 THERAPEUTIC EXERCISES: CPT | Performed by: PHYSICAL THERAPIST

## 2019-05-23 NOTE — PROGRESS NOTES
Orthopedic / Sports / Industrial Physical Therapy  Physical Therapy Initial Evaluation and Plan of Care    Patient Name: Jessy Eubanks          :  1934  Referring Physician: Any Carbajal MD  Diagnosis: Chronic left-sided low back pain with left-sided sciatica [M54.42, G89.29]  ;  Date of Evaluation: 2019  ______________________________________________________________________    Subjective Evaluation    History of Present Illness  Onset date: Long history h/o LBP; Worse over last 2 yrs -   Mechanism of injury: Insidious onset -     Pain  Current pain ratin  At worst pain rating: 10  Location: (L) LB and down buttock and down posterior thigh to calf (CONSTANT)  - (R) Buttock and down posterior thgh to calf mm (intermittent)  Quality: Throbbing;   Alleviating factors: Sitting, Prone with LLE flexed and IR'ed ;   Exacerbated by: Standing,>10 min  walking end of block; Bending; sweeping, laundry; making bed, dishes -   Progression: worsening    Diagnostic Tests  X-ray: abnormal (See Epic)    Treatments  Previous treatment: medication, chiropractic and physical therapy  Current treatment: chiropractic and medication  Patient Goals  Patient/family treatment goals: Pain reduction; Improved mobility and functional ability / ADL's.         ___________________________________________________  Objective       Postural Observations    Additional Postural Observation Details  Scoliotic curve to the (L) Mid/lower T-spine  (L) Ilium higher vs (R);  Hyperlordosis;   Guarded gait -     Palpation     Additional Palpation Details  Tender L3-S1 central and L>R and (B) Piriformis regions    Active Range of Motion     Additional Active Range of Motion Details  Limited and painful lumbar flexion, extension and SB with increased pain LB/LSS -     Strength/Myotome Testing     Additional Strength Details  LE Myotomes grossly intact -     Tests     Additional Tests Details  Pain with SLR L>R -   Flexed sacrum        See  Treatment Flow sheet for Exercises, Manual therapy, and modalities.   FUNCTIONAL ACTIVITIES: X 20 min  · TAPING / BRACING: NA  · Reviewed X-rays, discussed OA/DDD/DJD and anterior / Posterior listhesis, etc prognosis, use of TENS unit  · Instructed  in gentle distraction of LE to relieve pain in lumbar spine and HEP  · Jt protection, ADL modification; Posture and     ___________________________________________________  Assessment & Plan     Assessment  Assessment details: LBP; Lumbar radiculopathy; Lumbar OA/DDD/DJD/Spondylolisthesis (multi-levels)  Pt would benefit from TENS unit for pain control -     PROBLEMS: Pain; Limited mobility; Intolerance to ADLs requiring standing, walking, bending, etc.  PROGNOSIS: Fair     GOALS:   SHORT TERM GOALS: 2 weeks:  1) HEP Initiated; 2) Pain decreased 50%:   3) AROM  increased:  4) Improved functional ability grossly;     LONG TERM GOALS: 4 weeks (or at time of DISCHARGE): 1) (I) HEP; 2) AROM WFL and pain free; 3) Strength / mobility to be able to perform all ADL's and job-related activities w/o restrictions;       Plan  Planned therapy interventions: abdominal trunk stabilization, body mechanics training, flexibility, home exercise program, manual therapy, neuromuscular re-education, postural training, soft tissue mobilization, spinal/joint mobilization, strengthening, stretching and therapeutic activities  Frequency: 2x week  Duration in weeks: 4  Treatment plan discussed with: patient and .      ___________________________________________________  Manual Therapy:    10     mins  62337;   Therapeutic Exercise:    15     mins  26630;     Neuromuscular Bushra:        mins  58565;   Therapeutic Activity:     20     mins  97785;     Ultrasound:          mins  53397;    Electrical Stimulation:   20     mins  18546 ( );  Dry Needling          mins self-pay   Gait Training:          mins  56307;  EVAL TIME:   25 mins    Timed Treatment:   45   mins                 Total Treatment:     100   mins    PT SIGNATURE:   Luis Cochran, PT  DATE TREATMENT INITIATED: 5/23/2019  ___________________________________________________  Initial Certification  Certification Period: 8/21/2019  I certify that the therapy services are furnished while this patient is under my care.  The services outlined above are required by this patient, and will be reviewed every 90 days.     PHYSICIAN: ________________________________  DATE: ______  Any Carbajal MD        Please sign and return via fax to [phone].. Thank you, [facility].  ______________________________________________________________________  [address] [place] [address]  Phone: [phone] Fax: [phone]

## 2019-05-30 ENCOUNTER — TREATMENT (OUTPATIENT)
Dept: PHYSICAL THERAPY | Facility: CLINIC | Age: 84
End: 2019-05-30

## 2019-05-30 DIAGNOSIS — M54.30 SCIATICA, UNSPECIFIED LATERALITY: ICD-10-CM

## 2019-05-30 DIAGNOSIS — M54.42 CHRONIC LEFT-SIDED LOW BACK PAIN WITH LEFT-SIDED SCIATICA: Primary | ICD-10-CM

## 2019-05-30 DIAGNOSIS — G89.29 CHRONIC LEFT-SIDED LOW BACK PAIN WITH LEFT-SIDED SCIATICA: Primary | ICD-10-CM

## 2019-05-30 PROCEDURE — 97110 THERAPEUTIC EXERCISES: CPT | Performed by: PHYSICAL THERAPIST

## 2019-05-30 PROCEDURE — 97140 MANUAL THERAPY 1/> REGIONS: CPT | Performed by: PHYSICAL THERAPIST

## 2019-05-30 PROCEDURE — 97035 APP MDLTY 1+ULTRASOUND EA 15: CPT | Performed by: PHYSICAL THERAPIST

## 2019-05-30 NOTE — PROGRESS NOTES
Physical Therapy Daily Progress Note    Patient Name: Jessy Eubanks         :  1934  Referring Physician: Any Carbajal MD      Subjective   Jessy Eubanks reports: feeling some better after last session - Doing HEP which is helpful - Notes increased pain in (B) LE's and feet last night - During the day she notes LLE pain into calf mm - rarely into (R) LE -     Objective   Very tender (L) LSS / SI region into buttock, piriformis, TFL w/ multiple trigger points -     See Exercise, Manual, and Modality Logs for complete treatment.     Functional / Therapeutic Activities:   min  · TAPING / BRACING:   · Jt protection, ADL modification; Posture and      Assessment/Plan  LBP; Lumbar radiculopathy; Lumbar OA/DDD/DJD/Spondylolisthesis (multi-levels)  Pt would benefit from TENS unit for pain control -     Progress strengthening /stabilization /functional activity       _________________________________________________  Manual Therapy:    25     mins  73615;  Therapeutic Exercise:    10     mins  96853;     Neuromuscular Bushra:        mins  03657;    Therapeutic Activity:          mins  53455;     Gait Training:           mins  31211;     Ultrasound:     10     mins  16348;    Electrical Stimulation:         mins  26528 ( );  Dry Needling          mins self-pay    Timed Treatment:   45   mins                  Total Treatment:     55   mins    Luis Cochran PT  Physical Therapist

## 2019-06-06 ENCOUNTER — TREATMENT (OUTPATIENT)
Dept: PHYSICAL THERAPY | Facility: CLINIC | Age: 84
End: 2019-06-06

## 2019-06-06 DIAGNOSIS — M54.30 SCIATICA, UNSPECIFIED LATERALITY: ICD-10-CM

## 2019-06-06 DIAGNOSIS — M48.061 DEGENERATIVE LUMBAR SPINAL STENOSIS: ICD-10-CM

## 2019-06-06 DIAGNOSIS — G89.29 CHRONIC LEFT-SIDED LOW BACK PAIN WITH LEFT-SIDED SCIATICA: Primary | ICD-10-CM

## 2019-06-06 DIAGNOSIS — M54.42 CHRONIC LEFT-SIDED LOW BACK PAIN WITH LEFT-SIDED SCIATICA: Primary | ICD-10-CM

## 2019-06-06 DIAGNOSIS — M51.36 DDD (DEGENERATIVE DISC DISEASE), LUMBAR: ICD-10-CM

## 2019-06-06 PROCEDURE — 97140 MANUAL THERAPY 1/> REGIONS: CPT | Performed by: PHYSICAL THERAPIST

## 2019-06-06 PROCEDURE — 97110 THERAPEUTIC EXERCISES: CPT | Performed by: PHYSICAL THERAPIST

## 2019-06-10 ENCOUNTER — TREATMENT (OUTPATIENT)
Dept: PHYSICAL THERAPY | Facility: CLINIC | Age: 84
End: 2019-06-10

## 2019-06-10 DIAGNOSIS — M48.061 DEGENERATIVE LUMBAR SPINAL STENOSIS: ICD-10-CM

## 2019-06-10 DIAGNOSIS — M54.30 SCIATICA, UNSPECIFIED LATERALITY: ICD-10-CM

## 2019-06-10 DIAGNOSIS — G89.29 CHRONIC LEFT-SIDED LOW BACK PAIN WITH LEFT-SIDED SCIATICA: Primary | ICD-10-CM

## 2019-06-10 DIAGNOSIS — M51.36 DDD (DEGENERATIVE DISC DISEASE), LUMBAR: ICD-10-CM

## 2019-06-10 DIAGNOSIS — M54.42 CHRONIC LEFT-SIDED LOW BACK PAIN WITH LEFT-SIDED SCIATICA: Primary | ICD-10-CM

## 2019-06-10 PROCEDURE — 97140 MANUAL THERAPY 1/> REGIONS: CPT | Performed by: PHYSICAL THERAPIST

## 2019-06-10 PROCEDURE — 97110 THERAPEUTIC EXERCISES: CPT | Performed by: PHYSICAL THERAPIST

## 2019-06-10 NOTE — PROGRESS NOTES
Physical Therapy Daily Progress Note     Patient Name: Jessy Eubanks         :  1934  Referring Physician: Any Carbajal MD        Subjective   Jessy Eubanks reports: feeling some better after last session - Doing HEP which is helpful - Notes increased pain in (L) LE today -  into calf mm - rarely into (R) LE -      Objective   Very tender (L) LSS / SI region into buttock, piriformis, TFL w/ multiple trigger points -   (+) LLE Neural tension -   Pain alleviated with LA Distraction Lumbar spine -      See Exercise, Manual, and Modality Logs for complete treatment.     Functional / Therapeutic Activities:   min  · TAPING / BRACING:   · Jt protection, ADL modification; Posture and       Assessment/Plan  LBP; Lumbar radiculopathy; Lumbar OA/DDD/DJD/Spondylolisthesis (multi-levels)  Manual spinal distraction alleviated LE symptoms -   Pt may benefit from lumbar traction -      Progress strengthening /stabilization /functional activity   Add lumbar traction as tolerated -      _________________________________________________  Manual Therapy:            30     mins  27858;  Therapeutic Exercise:    15     mins  45081;     Neuromuscular Bushra:        mins  63039;    Therapeutic Activity:           mins  53547;     Gait Training:                      mins  80540;     Ultrasound:                          mins  98954;    Electrical Stimulation:         mins  05723 ( );  Dry Needling                       mins self-pay     Timed Treatment:   45   mins                  Total Treatment:     55   mins     Luis Cochran PT  Physical Therapist

## 2019-06-16 NOTE — PROGRESS NOTES
Physical Therapy Daily Progress Note     Patient Name: Jessy Eubanks         :  1934  Referring Physician: Any Carbajal MD        Subjective   Jessy Eubanks reports: feeling  better after last session - Doing HEP which is helpful - Notes decreased pain in (L) LE overall since last session, including today with no pain in lateral (L) lower leg/calf muscle today -  - rarely into (R) LE -  Deep massage helpful -      Objective   Very tender (L) LSS / SI region into buttock, piriformis, TFL w/ multiple trigger points -   (+) LLE Neural tension -   Improved gait - trunk more upright - less antalgic overall - increased WB=ing LLE -   Pain alleviated with LA Distraction Lumbar spine and LLE nerve gliding -      See Exercise, Manual, and Modality Logs for complete treatment.     Functional / Therapeutic Activities:   min  · TAPING / BRACING:   · Jt protection, ADL modification; Posture and       Assessment/Plan  LBP; Lumbar radiculopathy; Lumbar OA/DDD/DJD/Spondylolisthesis (multi-levels)  Decreasing pain and radiculopathy overall -   Manual spinal distraction alleviated LE symptoms -   Pt may benefit from lumbar traction -      Progress strengthening /stabilization /functional activity   Add lumbar traction as tolerated -      _________________________________________________  Manual Therapy:            30     mins  14509;  Therapeutic Exercise:    15     mins  78031;     Neuromuscular Bushra:        mins  20406;    Therapeutic Activity:           mins  84702;     Gait Training:                      mins  45661;     Ultrasound:                          mins  74040;    Electrical Stimulation:         mins  99841 ( );  Dry Needling                       mins self-pay     Timed Treatment:   45   mins                  Total Treatment:     55   mins     Luis Cochran PT  Physical Therapist

## 2019-06-20 ENCOUNTER — TREATMENT (OUTPATIENT)
Dept: PHYSICAL THERAPY | Facility: CLINIC | Age: 84
End: 2019-06-20

## 2019-06-20 DIAGNOSIS — M51.36 DDD (DEGENERATIVE DISC DISEASE), LUMBAR: ICD-10-CM

## 2019-06-20 DIAGNOSIS — M54.42 CHRONIC LEFT-SIDED LOW BACK PAIN WITH LEFT-SIDED SCIATICA: Primary | ICD-10-CM

## 2019-06-20 DIAGNOSIS — M54.30 SCIATICA, UNSPECIFIED LATERALITY: ICD-10-CM

## 2019-06-20 DIAGNOSIS — G89.29 CHRONIC LEFT-SIDED LOW BACK PAIN WITH LEFT-SIDED SCIATICA: Primary | ICD-10-CM

## 2019-06-20 DIAGNOSIS — M48.061 DEGENERATIVE LUMBAR SPINAL STENOSIS: ICD-10-CM

## 2019-06-20 PROCEDURE — 97110 THERAPEUTIC EXERCISES: CPT | Performed by: PHYSICAL THERAPIST

## 2019-06-20 PROCEDURE — 97140 MANUAL THERAPY 1/> REGIONS: CPT | Performed by: PHYSICAL THERAPIST

## 2019-06-20 NOTE — PROGRESS NOTES
Physical Therapy Daily Progress Note     Patient Name: Jessy Eubanks         :  1934  Referring Physician: Any Carbajal MD        Subjective   Jessy Eubanks reports: feeling  better after last session - Doing HEP which is helpful - Notes decreased pain in (L) LE overall since last session, including today with no pain in lateral (L) lower leg/calf muscle today -  - rarely into (R) LE -  Deep massage helpful -      Objective   Very tender (L) LSS / SI region into buttock, piriformis, TFL w/ multiple trigger points -   (+) LLE Neural tension -   Improved gait - trunk more upright - less antalgic overall - increased WB=ing LLE -   Pain alleviated with LA Distraction Lumbar spine and LLE nerve gliding -      See Exercise, Manual, and Modality Logs for complete treatment.     Functional / Therapeutic Activities:   min  · TAPING / BRACING:   · Jt protection, ADL modification; Posture and       Assessment/Plan  LBP; Lumbar radiculopathy; Lumbar OA/DDD/DJD/Spondylolisthesis (multi-levels)  Decreasing pain and radiculopathy overall -   Manual spinal distraction alleviated LE symptoms -   Pt may benefit from lumbar traction if radic increases -      Progress strengthening /stabilization /functional activity   Add lumbar traction as tolerated -      _________________________________________________  Manual Therapy:            25     mins  90226;  Therapeutic Exercise:    15     mins  34178;     Neuromuscular Bushra:        mins  28522;    Therapeutic Activity:           mins  48869;     Gait Training:                      mins  46192;     Ultrasound:                          mins  09368;    Electrical Stimulation:         mins  02733 ( );  Dry Needling                       mins self-pay     Timed Treatment:   40   mins                  Total Treatment:     55   mins     Luis Cochran PT  Physical Therapist

## 2019-06-27 ENCOUNTER — TREATMENT (OUTPATIENT)
Dept: PHYSICAL THERAPY | Facility: CLINIC | Age: 84
End: 2019-06-27

## 2019-06-27 DIAGNOSIS — G89.29 CHRONIC LEFT-SIDED LOW BACK PAIN WITH LEFT-SIDED SCIATICA: Primary | ICD-10-CM

## 2019-06-27 DIAGNOSIS — M54.42 CHRONIC LEFT-SIDED LOW BACK PAIN WITH LEFT-SIDED SCIATICA: Primary | ICD-10-CM

## 2019-06-27 DIAGNOSIS — M51.36 DDD (DEGENERATIVE DISC DISEASE), LUMBAR: ICD-10-CM

## 2019-06-27 DIAGNOSIS — M54.30 SCIATICA, UNSPECIFIED LATERALITY: ICD-10-CM

## 2019-06-27 DIAGNOSIS — M48.061 DEGENERATIVE LUMBAR SPINAL STENOSIS: ICD-10-CM

## 2019-06-27 PROCEDURE — 97140 MANUAL THERAPY 1/> REGIONS: CPT | Performed by: PHYSICAL THERAPIST

## 2019-06-27 PROCEDURE — 97110 THERAPEUTIC EXERCISES: CPT | Performed by: PHYSICAL THERAPIST

## 2019-07-01 RX ORDER — OMEPRAZOLE 20 MG/1
CAPSULE, DELAYED RELEASE ORAL
Qty: 90 CAPSULE | Refills: 1 | Status: SHIPPED | OUTPATIENT
Start: 2019-07-01 | End: 2019-12-30

## 2019-07-02 NOTE — PROGRESS NOTES
Physical Therapy Daily Progress Note     Patient Name: Jessy Eubanks         :  1934  Referring Physician: Any Carbajal MD        Subjective   Jessy Eubanks reports: feeling  better after last session - Doing HEP which is helpful - Notes decreased pain in (L) LE overall since last session, including today with no pain in lateral (L) lower leg/calf muscle today -  - rarely into (R) LE -  Deep massage helpful -      Objective   Very tender (L) LSS / SI region into buttock, piriformis, TFL w/ multiple trigger points -   (+) LLE Neural tension,  But less overall  -   Improved gait - trunk more upright - less antalgic overall - increased WB=ing LLE -   Pain alleviated with LA Distraction Lumbar spine and LLE nerve gliding -      See Exercise, Manual, and Modality Logs for complete treatment.     Functional / Therapeutic Activities:   min  · TAPING / BRACING:   · Jt protection, ADL modification; Posture and       Assessment/Plan  LBP; Lumbar radiculopathy; Lumbar OA/DDD/DJD/Spondylolisthesis (multi-levels)  Decreasing pain and radiculopathy overall -   Manual spinal distraction alleviated LE symptoms -   Pt may benefit from lumbar traction if radic increases -      Progress strengthening /stabilization /functional activity   Add lumbar traction as tolerated -      _________________________________________________  Manual Therapy:            25     mins  92708;  Therapeutic Exercise:    15     mins  11496;     Neuromuscular Bushra:        mins  71458;    Therapeutic Activity:           mins  53338;     Gait Training:                      mins  29239;     Ultrasound:                          mins  63428;    Electrical Stimulation:         mins  44544 ( );  Dry Needling                       mins self-pay     Timed Treatment:   40   mins                  Total Treatment:     55   mins     Luis Cochran PT  Physical Therapist

## 2019-07-11 ENCOUNTER — TREATMENT (OUTPATIENT)
Dept: PHYSICAL THERAPY | Facility: CLINIC | Age: 84
End: 2019-07-11

## 2019-07-11 DIAGNOSIS — M54.30 SCIATICA, UNSPECIFIED LATERALITY: ICD-10-CM

## 2019-07-11 DIAGNOSIS — M51.36 DDD (DEGENERATIVE DISC DISEASE), LUMBAR: ICD-10-CM

## 2019-07-11 DIAGNOSIS — G89.29 CHRONIC LEFT-SIDED LOW BACK PAIN WITH LEFT-SIDED SCIATICA: Primary | ICD-10-CM

## 2019-07-11 DIAGNOSIS — M48.061 DEGENERATIVE LUMBAR SPINAL STENOSIS: ICD-10-CM

## 2019-07-11 DIAGNOSIS — M54.42 CHRONIC LEFT-SIDED LOW BACK PAIN WITH LEFT-SIDED SCIATICA: Primary | ICD-10-CM

## 2019-07-11 PROCEDURE — 97140 MANUAL THERAPY 1/> REGIONS: CPT | Performed by: PHYSICAL THERAPIST

## 2019-07-15 NOTE — PROGRESS NOTES
Physical Therapy Daily Progress Note     Patient Name: Jessy Eubanks         :  1934  Referring Physician: Any Carbajal MD        Subjective   Jessy Eubanks reports: feeling much better after last session, but then they went to [facility] and by the time they left [facility], she was complaining of lateral (L) lower leg pain and (L) LBP  - Doing HEP and  doing gentle distraction, which is normally very helpful, but this did not alleviate her symptoms -   Today pain in (L) LB and lateral (L) lower leg -      Objective   Pt ambulating SB to (L) and flexed and with antalgic gait LLE -  Very tender (L) LSS / SI region into buttock, piriformis, TFL, lateral lower leg and ITB LLE w/ multiple trigger points -   (+) LLE Neural tension increased overall  -   After manual therapy Pt demonstrated Improved gait - trunk more upright - less antalgic overall - increased WB=ing LLE -   Pain alleviated with STM/DTM, SI mobilization,LA Distraction Lumbar spine and LLE nerve gliding -      See Exercise, Manual, and Modality Logs for complete treatment.     Functional / Therapeutic Activities:   min  · TAPING / BRACING:   · Jt protection, ADL modification; Posture and       Assessment/Plan  LBP; Lumbar radiculopathy; Lumbar OA/DDD/DJD/Spondylolisthesis (multi-levels)  Decreasing pain and radiculopathy overall - until after going to [facility] after last session -   Manual spinal distraction, LLE nerve gliding, and mobiliztions alleviated LE symptoms -   Pt may benefit from lumbar traction if radic increases -      Progress strengthening /stabilization /functional activity   Add lumbar traction as tolerated -      _________________________________________________  Manual Therapy:            45     mins  17114;  Therapeutic Exercise:         mins  07570;     Neuromuscular Bushra:        mins  16393;    Therapeutic Activity:           mins  79723;     Gait Training:                      mins  72279;      Ultrasound:                          mins  02502;    Electrical Stimulation:         mins  00572 ( );  Dry Needling                       mins self-pay     Timed Treatment:   45   mins                  Total Treatment:     60   mins     Luis Cochran, PT  Physical Therapist

## 2019-07-18 ENCOUNTER — TREATMENT (OUTPATIENT)
Dept: PHYSICAL THERAPY | Facility: CLINIC | Age: 84
End: 2019-07-18

## 2019-07-18 DIAGNOSIS — M54.42 CHRONIC LEFT-SIDED LOW BACK PAIN WITH LEFT-SIDED SCIATICA: Primary | ICD-10-CM

## 2019-07-18 DIAGNOSIS — M48.061 DEGENERATIVE LUMBAR SPINAL STENOSIS: ICD-10-CM

## 2019-07-18 DIAGNOSIS — M54.30 SCIATICA, UNSPECIFIED LATERALITY: ICD-10-CM

## 2019-07-18 DIAGNOSIS — M51.36 DDD (DEGENERATIVE DISC DISEASE), LUMBAR: ICD-10-CM

## 2019-07-18 DIAGNOSIS — G89.29 CHRONIC LEFT-SIDED LOW BACK PAIN WITH LEFT-SIDED SCIATICA: Primary | ICD-10-CM

## 2019-07-18 DIAGNOSIS — M54.16 RADICULOPATHY, LUMBAR REGION: ICD-10-CM

## 2019-07-18 PROCEDURE — 97012 MECHANICAL TRACTION THERAPY: CPT | Performed by: PHYSICAL THERAPIST

## 2019-07-18 PROCEDURE — 97110 THERAPEUTIC EXERCISES: CPT | Performed by: PHYSICAL THERAPIST

## 2019-07-18 PROCEDURE — 97530 THERAPEUTIC ACTIVITIES: CPT | Performed by: PHYSICAL THERAPIST

## 2019-07-22 NOTE — PROGRESS NOTES
Physical Therapy Daily Progress Note     Patient Name: Jessy Eubanks         :  1934  Referring Physician: Any Carbajal MD        Subjective   Jessy Eubanks reports: feeling much better after last 2019 session, but then they went to [facility] and by the time they left [facility], she was complaining of lateral (L) lower leg pain and (L) LBP  - Doing HEP and  doing gentle distraction, which is normally very helpful, but this did not alleviate her symptoms -   Today pain in  lateral (L) lower leg and increased with LLE WB-ing - alleviated when sitting / supine -      Objective   Pt ambulating SB to (L) and flexed and with antalgic gait LLE -  Less  tender (L) LSS / SI region into buttock, piriformis, -   Initiated Lumbar traction today -      See Exercise, Manual, and Modality Logs for complete treatment.     Functional / Therapeutic Activities:  10  min  · TAPING / BRACING:   · Gait training with St cane in RUE to allow improved gait and unload LLE to improve functional assessment and prevent falls   · Jt protection, ADL modification; Posture and       Assessment/Plan  LBP; Lumbar radiculopathy; Lumbar OA/DDD/DJD/Spondylolisthesis (multi-levels)  Decreasing pain and radiculopathy overall - until after going to [facility] after 19 session -   Manual spinal distraction, LLE nerve gliding, and mobilizations been successful in alleviating LLE symptoms -      Progress strengthening /stabilization /functional activity   Assess effectiveness of lumbar traction  -      _________________________________________________  Manual Therapy:                 mins  71808;  Therapeutic Exercise:     15    mins  89378;     Neuromuscular Bushra:        mins  67846;    Therapeutic Activity:      10     mins  20803;     Ultrasound:                          mins  32781;    Lumbar Traction:            15     mins  28961 ;  Dry Needling                       mins self-pay     Timed Treatment:   25   mins                   Total Treatment:    50   mins     Luis Cochran, PT  Physical Therapist

## 2019-07-25 ENCOUNTER — TREATMENT (OUTPATIENT)
Dept: PHYSICAL THERAPY | Facility: CLINIC | Age: 84
End: 2019-07-25

## 2019-07-25 DIAGNOSIS — G89.29 CHRONIC LEFT-SIDED LOW BACK PAIN WITH LEFT-SIDED SCIATICA: ICD-10-CM

## 2019-07-25 DIAGNOSIS — M51.36 DDD (DEGENERATIVE DISC DISEASE), LUMBAR: ICD-10-CM

## 2019-07-25 DIAGNOSIS — M54.16 RADICULOPATHY, LUMBAR REGION: Primary | ICD-10-CM

## 2019-07-25 DIAGNOSIS — M48.061 DEGENERATIVE LUMBAR SPINAL STENOSIS: ICD-10-CM

## 2019-07-25 DIAGNOSIS — M54.42 CHRONIC LEFT-SIDED LOW BACK PAIN WITH LEFT-SIDED SCIATICA: ICD-10-CM

## 2019-07-25 DIAGNOSIS — M54.30 SCIATICA, UNSPECIFIED LATERALITY: ICD-10-CM

## 2019-07-25 PROCEDURE — 97012 MECHANICAL TRACTION THERAPY: CPT | Performed by: PHYSICAL THERAPIST

## 2019-07-25 PROCEDURE — 97140 MANUAL THERAPY 1/> REGIONS: CPT | Performed by: PHYSICAL THERAPIST

## 2019-07-29 NOTE — PROGRESS NOTES
Physical Therapy Daily Progress Note     Patient Name: Jessy Eubanks         :  1934  Referring Physician: Any Carbajal MD        Subjective   Jessy Eubanks reports: feeling much better after last 2019 session, but then they went to [facility] and by the time they left [facility], she was complaining of lateral (L) lower leg pain and (L) LBP  - Doing HEP and  doing gentle distraction, which is normally very helpful, but this did not alleviate her symptoms -   Today pain in  lateral (L) lower leg and increased with LLE WB-ing - alleviated when sitting / supine -   Temporary improvement after traction last session, but pain returned in LLE and LB/Hip  Pt says she is practicing use of cane at home, but not using it outside of home - Uses her 's arm -     Objective   Pt ambulating SB to (L) and flexed and with antalgic gait LLE -  Very tender (L) LSS / SI region into buttock, piriformis, w/ multiple trigger points-   LLE Neural tension   Continued Lumbar traction today -   Discussed going to see Spine specialist due to persistent symptoms     See Exercise, Manual, and Modality Logs for complete treatment.     Functional / Therapeutic Activities:    min  · TAPING / BRACING:   · Jt protection, ADL modification; Posture and       Assessment/Plan  LBP; Lumbar radiculopathy; Lumbar OA/DDD/DJD/Spondylolisthesis (multi-levels)  Decreasing pain and radiculopathy overall - until after going to [facility] after 19 session - now unable to centralize / alleviate these symptoms -  Pt may benefit from assessment from Spine specialist -  -      Progress strengthening /stabilization /functional activity   Pt to contact spine specialist prn -      _________________________________________________  Manual Therapy:          30      mins  52503;  Therapeutic Exercise:        mins  58868;     Neuromuscular [place]:        mins  43705;    Therapeutic Activity:           mins  91496;      Ultrasound:                          mins  73037;    Lumbar Traction:            15     mins  35664 ;  Dry Needling                       mins self-pay     Timed Treatment:   30   mins                  Total Treatment:    55   mins     Luis Cochran, PT  Physical Therapist

## 2019-09-10 RX ORDER — LEVOTHYROXINE SODIUM 0.1 MG/1
TABLET ORAL
Qty: 90 TABLET | Refills: 1 | Status: SHIPPED | OUTPATIENT
Start: 2019-09-10 | End: 2020-02-24

## 2019-10-07 RX ORDER — ATORVASTATIN CALCIUM 40 MG/1
TABLET, FILM COATED ORAL
Qty: 90 TABLET | Refills: 1 | Status: SHIPPED | OUTPATIENT
Start: 2019-10-07 | End: 2020-07-06

## 2019-10-07 RX ORDER — POTASSIUM CHLORIDE 20 MEQ/1
TABLET, EXTENDED RELEASE ORAL
Qty: 90 TABLET | Refills: 1 | Status: SHIPPED | OUTPATIENT
Start: 2019-10-07 | End: 2020-04-16

## 2019-11-08 LAB
ALBUMIN SERPL-MCNC: 3.9 G/DL (ref 3.5–5.2)
ALBUMIN/GLOB SERPL: 1.7 G/DL
ALP SERPL-CCNC: 68 U/L (ref 39–117)
ALT SERPL-CCNC: 18 U/L (ref 1–33)
AST SERPL-CCNC: 19 U/L (ref 1–32)
BASOPHILS # BLD AUTO: (no result) 10*3/UL
BASOPHILS # BLD MANUAL: 0.05 10*3/MM3 (ref 0–0.2)
BASOPHILS NFR BLD MANUAL: 1.1 % (ref 0–1.5)
BILIRUB SERPL-MCNC: 0.5 MG/DL (ref 0.2–1.2)
BUN SERPL-MCNC: 18 MG/DL (ref 8–23)
BUN/CREAT SERPL: 22.2 (ref 7–25)
CALCIUM SERPL-MCNC: 8.9 MG/DL (ref 8.6–10.5)
CHLORIDE SERPL-SCNC: 106 MMOL/L (ref 98–107)
CHOLEST SERPL-MCNC: 154 MG/DL (ref 0–200)
CO2 SERPL-SCNC: 31.2 MMOL/L (ref 22–29)
CREAT SERPL-MCNC: 0.81 MG/DL (ref 0.57–1)
DIFFERENTIAL COMMENT: ABNORMAL
EOSINOPHIL # BLD AUTO: (no result) 10*3/UL
EOSINOPHIL # BLD MANUAL: 0.33 10*3/MM3 (ref 0–0.4)
EOSINOPHIL NFR BLD AUTO: (no result) %
EOSINOPHIL NFR BLD MANUAL: 7.4 % (ref 0.3–6.2)
ERYTHROCYTE [DISTWIDTH] IN BLOOD BY AUTOMATED COUNT: 13.8 % (ref 12.3–15.4)
GLOBULIN SER CALC-MCNC: 2.3 GM/DL
GLUCOSE SERPL-MCNC: 123 MG/DL (ref 65–99)
HBA1C MFR BLD: 5.9 % (ref 4.8–5.6)
HCT VFR BLD AUTO: 37.9 % (ref 34–46.6)
HDLC SERPL-MCNC: 55 MG/DL (ref 40–60)
HGB BLD-MCNC: 12.1 G/DL (ref 12–15.9)
LDLC SERPL CALC-MCNC: 81 MG/DL (ref 0–100)
LDLC/HDLC SERPL: 1.47 {RATIO}
LYMPHOCYTES # BLD AUTO: (no result) 10*3/UL
LYMPHOCYTES # BLD MANUAL: 1.21 10*3/MM3 (ref 0.7–3.1)
LYMPHOCYTES NFR BLD AUTO: (no result) %
LYMPHOCYTES NFR BLD MANUAL: 27.4 % (ref 19.6–45.3)
MCH RBC QN AUTO: 28.5 PG (ref 26.6–33)
MCHC RBC AUTO-ENTMCNC: 31.9 G/DL (ref 31.5–35.7)
MCV RBC AUTO: 89.4 FL (ref 79–97)
MONOCYTES # BLD MANUAL: 0.19 10*3/MM3 (ref 0.1–0.9)
MONOCYTES NFR BLD AUTO: (no result) %
MONOCYTES NFR BLD MANUAL: 4.2 % (ref 5–12)
NEUTROPHILS # BLD MANUAL: 2.65 10*3/MM3 (ref 1.7–7)
NEUTROPHILS NFR BLD AUTO: (no result) %
NEUTROPHILS NFR BLD MANUAL: 60 % (ref 42.7–76)
PLATELET # BLD AUTO: 247 10*3/MM3 (ref 140–450)
PLATELET BLD QL SMEAR: ABNORMAL
POTASSIUM SERPL-SCNC: 3.7 MMOL/L (ref 3.5–5.2)
PROT SERPL-MCNC: 6.2 G/DL (ref 6–8.5)
RBC # BLD AUTO: 4.24 10*6/MM3 (ref 3.77–5.28)
RBC MORPH BLD: ABNORMAL
SODIUM SERPL-SCNC: 147 MMOL/L (ref 136–145)
TRIGL SERPL-MCNC: 90 MG/DL (ref 0–150)
TSH SERPL DL<=0.005 MIU/L-ACNC: 1.38 UIU/ML (ref 0.27–4.2)
VLDLC SERPL CALC-MCNC: 18 MG/DL
WBC # BLD AUTO: 4.41 10*3/MM3 (ref 3.4–10.8)

## 2019-11-09 ENCOUNTER — RESULTS ENCOUNTER (OUTPATIENT)
Dept: INTERNAL MEDICINE | Facility: CLINIC | Age: 84
End: 2019-11-09

## 2019-11-09 DIAGNOSIS — I10 ESSENTIAL HYPERTENSION: ICD-10-CM

## 2019-11-09 DIAGNOSIS — E03.8 OTHER SPECIFIED HYPOTHYROIDISM: ICD-10-CM

## 2019-11-09 DIAGNOSIS — E78.5 HYPERLIPIDEMIA, UNSPECIFIED HYPERLIPIDEMIA TYPE: ICD-10-CM

## 2019-11-09 DIAGNOSIS — E11.8 TYPE 2 DIABETES MELLITUS WITH COMPLICATION (HCC): ICD-10-CM

## 2019-11-14 ENCOUNTER — OFFICE VISIT (OUTPATIENT)
Dept: INTERNAL MEDICINE | Facility: CLINIC | Age: 84
End: 2019-11-14

## 2019-11-14 VITALS
BODY MASS INDEX: 28.98 KG/M2 | OXYGEN SATURATION: 97 % | SYSTOLIC BLOOD PRESSURE: 108 MMHG | HEIGHT: 60 IN | WEIGHT: 147.6 LBS | DIASTOLIC BLOOD PRESSURE: 64 MMHG | TEMPERATURE: 97.7 F | HEART RATE: 69 BPM

## 2019-11-14 DIAGNOSIS — Z00.00 MEDICARE ANNUAL WELLNESS VISIT, SUBSEQUENT: ICD-10-CM

## 2019-11-14 DIAGNOSIS — E78.5 HYPERLIPIDEMIA, UNSPECIFIED HYPERLIPIDEMIA TYPE: ICD-10-CM

## 2019-11-14 DIAGNOSIS — I10 ESSENTIAL HYPERTENSION: Primary | ICD-10-CM

## 2019-11-14 DIAGNOSIS — K21.9 GASTROESOPHAGEAL REFLUX DISEASE, ESOPHAGITIS PRESENCE NOT SPECIFIED: ICD-10-CM

## 2019-11-14 DIAGNOSIS — E11.9 TYPE 2 DIABETES MELLITUS WITHOUT COMPLICATION, UNSPECIFIED WHETHER LONG TERM INSULIN USE (HCC): ICD-10-CM

## 2019-11-14 DIAGNOSIS — E03.8 OTHER SPECIFIED HYPOTHYROIDISM: ICD-10-CM

## 2019-11-14 PROCEDURE — G0008 ADMIN INFLUENZA VIRUS VAC: HCPCS | Performed by: INTERNAL MEDICINE

## 2019-11-14 PROCEDURE — G0439 PPPS, SUBSEQ VISIT: HCPCS | Performed by: INTERNAL MEDICINE

## 2019-11-14 PROCEDURE — 99214 OFFICE O/P EST MOD 30 MIN: CPT | Performed by: INTERNAL MEDICINE

## 2019-11-14 PROCEDURE — 90653 IIV ADJUVANT VACCINE IM: CPT | Performed by: INTERNAL MEDICINE

## 2019-11-14 RX ORDER — TRAVOPROST OPHTHALMIC SOLUTION 0.04 MG/ML
1 SOLUTION OPHTHALMIC NIGHTLY
COMMUNITY
End: 2022-02-28

## 2019-11-14 NOTE — PROGRESS NOTES
The ABCs of the Annual Wellness Visit  Subsequent Medicare Wellness Visit    No chief complaint on file.      Subjective   History of Present Illness:  Jessy Eubanks is a 85 y.o. female who presents for a Subsequent Medicare Wellness Visit.    HEALTH RISK ASSESSMENT    Recent Hospitalizations:  No hospitalization(s) within the last year.    Current Medical Providers:  Patient Care Team:  Any Carbajal MD as PCP - General  Guillermo Aguillon MD as PCP - Claims Attributed  Guillermo Aguillon MD as Consulting Physician (Cardiology)    Smoking Status:  Social History     Tobacco Use   Smoking Status Never Smoker       Alcohol Consumption:  Social History     Substance and Sexual Activity   Alcohol Use No       Depression Screen:   PHQ-2/PHQ-9 Depression Screening 11/14/2019   Little interest or pleasure in doing things 0   Feeling down, depressed, or hopeless 0   Trouble falling or staying asleep, or sleeping too much 1   Feeling tired or having little energy 0   Poor appetite or overeating 0   Feeling bad about yourself - or that you are a failure or have let yourself or your family down 0   Trouble concentrating on things, such as reading the newspaper or watching television 0   Moving or speaking so slowly that other people could have noticed. Or the opposite - being so fidgety or restless that you have been moving around a lot more than usual 0   Thoughts that you would be better off dead, or of hurting yourself in some way 0   Total Score 1   If you checked off any problems, how difficult have these problems made it for you to do your work, take care of things at home, or get along with other people? Not difficult at all       Fall Risk Screen:  STEADI Fall Risk Assessment was completed, and patient is at LOW risk for falls.Assessment completed on:11/14/2019    Health Habits and Functional and Cognitive Screening:  Functional & Cognitive Status 11/14/2019   Do you have difficulty preparing food and eating?  No   Do you have difficulty bathing yourself, getting dressed or grooming yourself? No   Do you have difficulty using the toilet? No   Do you have difficulty moving around from place to place? No   Do you have trouble with steps or getting out of a bed or a chair? Yes   Current Diet Low Carb Diet   Dental Exam Up to date   Eye Exam Up to date   Exercise (times per week) 0 times per week   Current Exercise Activities Include None   Do you need help using the phone?  No   Are you deaf or do you have serious difficulty hearing?  No   Do you need help with transportation? No   Do you need help shopping? No   Do you need help preparing meals?  No   Do you need help with housework?  Yes   Do you need help with laundry? No   Do you need help taking your medications? No   Do you need help managing money? No   Do you ever drive or ride in a car without wearing a seat belt? No   Have you felt unusual stress, anger or loneliness in the last month? No   Who do you live with? Spouse   If you need help, do you have trouble finding someone available to you? No   Have you been bothered in the last four weeks by sexual problems? No   Do you have difficulty concentrating, remembering or making decisions? Yes         Does the patient have evidence of cognitive impairment? Yes 2/3 recall    Asprin use counseling:Does not need ASA (and currently is not on it)    Age-appropriate Screening Schedule:  Refer to the list below for future screening recommendations based on patient's age, sex and/or medical conditions. Orders for these recommended tests are listed in the plan section. The patient has been provided with a written plan.    Health Maintenance   Topic Date Due   • TDAP/TD VACCINES (1 - Tdap) 10/07/1953   • ZOSTER VACCINE (2 of 3) 02/26/2012   • INFLUENZA VACCINE  08/01/2019   • HEMOGLOBIN A1C  05/07/2020   • DIABETIC EYE EXAM  06/25/2020   • LIPID PANEL  11/07/2020   • PNEUMOCOCCAL VACCINES (65+ LOW/MEDIUM RISK)  Completed   •  MAMMOGRAM  Discontinued   • URINE MICROALBUMIN  Discontinued   • DXA SCAN  Discontinued          The following portions of the patient's history were reviewed and updated as appropriate: allergies, current medications, past family history, past medical history, past social history, past surgical history and problem list.    Outpatient Medications Prior to Visit   Medication Sig Dispense Refill   • atorvastatin (LIPITOR) 40 MG tablet TAKE 1 TABLET DAILY 90 tablet 1   • calcium (OS-ELIZABETH) 600 MG tablet Take  by mouth daily.     • carvedilol (COREG) 6.25 MG tablet Take 1 tablet by mouth 2 (Two) Times a Day.     • Cholecalciferol (VITAMIN D3) 2000 UNITS tablet Take 1 capsule by mouth.     • Coenzyme Q10 (CO Q 10) 100 MG capsule Take 1 capsule by mouth Daily.     • ENTRESTO 49-51 MG tablet Take 1 tablet by mouth 2 (Two) Times a Day.     • levothyroxine (SYNTHROID, LEVOTHROID) 100 MCG tablet TAKE 1 TABLET DAILY 90 tablet 1   • naproxen sodium (ALEVE) 220 MG tablet Take 220 mg by mouth 2 (Two) Times a Day With Meals.     • omeprazole (priLOSEC) 20 MG capsule TAKE 1 CAPSULE DAILY 90 capsule 1   • potassium chloride (K-DUR,KLOR-CON) 20 MEQ CR tablet TAKE 1 TABLET DAILY 90 tablet 1   • SIMBRINZA 1-0.2 % suspension 1 drop 3 (Three) Times a Day.     • Timolol Hemihydrate (BETIMOL OP) Apply 1 drop to eye(s) as directed by provider Daily.     • travoprost, PAUL free, (TRAVATAN) 0.004 % solution ophthalmic solution 1 drop Every Night. in affected eye(s)     • amitriptyline (ELAVIL) 10 MG tablet TAKE 1 TABLET EVERY NIGHT 90 tablet 3     No facility-administered medications prior to visit.        Patient Active Problem List   Diagnosis   • Diabetic peripheral neuropathy (CMS/HCC)   • Gastroesophageal reflux disease   • Hyperlipidemia   • Hypertension   • Hypothyroidism   • Restless legs syndrome   • Type 2 diabetes mellitus (CMS/HCC)   • Cobalamin deficiency   • Vitamin D deficiency   • Sciatica   • Hematuria   • Chronic systolic  "congestive heart failure (CMS/[facility])       Advanced Care Planning:  Patient has an advance directive - a copy has been provided and is visible in patient header    Review of Systems    Compared to one year ago, the patient feels her physical health is better. As pain better  Compared to one year ago, the patient feels her mental health is the same.    Reviewed chart for potential of high risk medication in the elderly: yes  Reviewed chart for potential of harmful drug interactions in the elderly:yes    Objective         Vitals:    11/14/19 1339   BP: 108/64   BP Location: Left arm   Patient Position: Sitting   Pulse: 69   Temp: 97.7 °F (36.5 °C)   TempSrc: Oral   SpO2: 97%   Weight: 67 kg (147 lb 9.6 oz)   Height: 153 cm (60.24\")   PainSc: 0-No pain       Body mass index is 28.6 kg/m².  Discussed the patient's BMI with her. The BMI is in the acceptable range.    Physical Exam    Lab Results   Component Value Date     (H) 11/07/2019    CHLPL 154 11/07/2019    TRIG 90 11/07/2019    HDL 55 11/07/2019    LDL 81 11/07/2019    VLDL 18 11/07/2019    HGBA1C 5.90 (H) 11/07/2019        Assessment/Plan   Medicare Risks and Personalized Health Plan  CMS Preventative Services Quick Reference  Cardiovascular risk  Immunizations Discussed/Encouraged (specific immunizations; Shingrix )    The above risks/problems have been discussed with the patient.  Pertinent information has been shared with the patient in the After Visit Summary.  Follow up plans and orders are seen below in the Assessment/Plan Section.    Diagnoses and all orders for this visit:    1. Essential hypertension (Primary)    2. Hyperlipidemia, unspecified hyperlipidemia type    3. Gastroesophageal reflux disease, esophagitis presence not specified    4. Type 2 diabetes mellitus without complication, unspecified whether long term insulin use (CMS/[facility])    5. Other specified hypothyroidism    6. Medicare annual wellness visit, subsequent      Follow Up:  No " Follow-up on file.     An After Visit Summary and PPPS were given to the patient.         She will get the flu shot tiday  I have rec reg exercise and cont to wokr on balance

## 2019-11-14 NOTE — PROGRESS NOTES
Subjective   Jessy Eubanks is a 85 y.o. female here today to f/u on HTN, DM 2 and hyperlipidemia.      History of Present Illness   Pt has been doing well with thyroid meds.  Taking as perscribed without any complications  Pt has been compliant with meds for GERD.  No sx as long as pt takes medicine as prescribed.  No epigastric pain or reflux sx  Pt has been taking cholesterol meds as prescribed.  No difficulties with myalgias.   She has been stable with CHF    The following portions of the patient's history were reviewed and updated as appropriate: allergies, current medications, past medical history, past social history and problem list.  She does eat a healthy diet most of the time    Review of Systems   All other systems reviewed and are negative.      Objective   Physical Exam   Constitutional: She is oriented to person, place, and time. She appears well-developed and well-nourished.   HENT:   Head: Normocephalic and atraumatic.   Right Ear: External ear normal.   Left Ear: External ear normal.   Mouth/Throat: Oropharynx is clear and moist.   Eyes: Conjunctivae and EOM are normal. Pupils are equal, round, and reactive to light.   Neck: Normal range of motion. No tracheal deviation present. No thyromegaly present.   Cardiovascular: Normal rate, regular rhythm, normal heart sounds and intact distal pulses.   Pulmonary/Chest: Effort normal and breath sounds normal.   Abdominal: Soft. Bowel sounds are normal. She exhibits no distension. There is no tenderness.   Musculoskeletal: Normal range of motion. She exhibits no edema or deformity.   Neurological: She is alert and oriented to person, place, and time.   Skin: Skin is warm and dry.   Psychiatric: She has a normal mood and affect. Her behavior is normal. Judgment and thought content normal.   Vitals reviewed.      Vitals:    11/14/19 1339   BP: 108/64   Pulse: 69   Temp: 97.7 °F (36.5 °C)   SpO2: 97%     Body mass index is 28.6 kg/m².  Results Encounter on  11/09/2019   Component Date Value Ref Range Status   • Hemoglobin A1C 11/07/2019 5.90* 4.80 - 5.60 % Final    Comment: Hemoglobin A1C Ranges:  Increased Risk for Diabetes  5.7% to 6.4%  Diabetes                     >= 6.5%  Diabetic Goal                < 7.0%     • Glucose 11/07/2019 123* 65 - 99 mg/dL Final   • BUN 11/07/2019 18  8 - 23 mg/dL Final   • Creatinine 11/07/2019 0.81  0.57 - 1.00 mg/dL Final   • eGFR Non  Am 11/07/2019 67  >60 mL/min/1.73 Final    Comment: The MDRD GFR formula is only valid for adults with stable  renal function between ages 18 and 70.     • eGFR  Am 11/07/2019 81  >60 mL/min/1.73 Final   • BUN/Creatinine Ratio 11/07/2019 22.2  7.0 - 25.0 Final   • Sodium 11/07/2019 147* 136 - 145 mmol/L Final   • Potassium 11/07/2019 3.7  3.5 - 5.2 mmol/L Final   • Chloride 11/07/2019 106  98 - 107 mmol/L Final   • Total CO2 11/07/2019 31.2* 22.0 - 29.0 mmol/L Final   • Calcium 11/07/2019 8.9  8.6 - 10.5 mg/dL Final   • Total Protein 11/07/2019 6.2  6.0 - 8.5 g/dL Final   • Albumin 11/07/2019 3.90  3.50 - 5.20 g/dL Final   • Globulin 11/07/2019 2.3  gm/dL Final   • A/G Ratio 11/07/2019 1.7  g/dL Final   • Total Bilirubin 11/07/2019 0.5  0.2 - 1.2 mg/dL Final   • Alkaline Phosphatase 11/07/2019 68  39 - 117 U/L Final   • AST (SGOT) 11/07/2019 19  1 - 32 U/L Final   • ALT (SGPT) 11/07/2019 18  1 - 33 U/L Final   • Total Cholesterol 11/07/2019 154  0 - 200 mg/dL Final   • Triglycerides 11/07/2019 90  0 - 150 mg/dL Final   • HDL Cholesterol 11/07/2019 55  40 - 60 mg/dL Final   • VLDL Cholesterol 11/07/2019 18  mg/dL Final   • LDL Cholesterol  11/07/2019 81  0 - 100 mg/dL Final   • LDL/HDL Ratio 11/07/2019 1.47   Final   • TSH 11/07/2019 1.380  0.270 - 4.200 uIU/mL Final   • WBC 11/07/2019 4.41  3.40 - 10.80 10*3/mm3 Final   • RBC 11/07/2019 4.24  3.77 - 5.28 10*6/mm3 Final   • Hemoglobin 11/07/2019 12.1  12.0 - 15.9 g/dL Final   • Hematocrit 11/07/2019 37.9  34.0 - 46.6 % Final   • MCV  11/07/2019 89.4  79.0 - 97.0 fL Final   • MCH 11/07/2019 28.5  26.6 - 33.0 pg Final   • MCHC 11/07/2019 31.9  31.5 - 35.7 g/dL Final   • RDW 11/07/2019 13.8  12.3 - 15.4 % Final   • Platelets 11/07/2019 247  140 - 450 10*3/mm3 Final   • Neutrophil Rel % 11/07/2019 CANCELED   Final-Edited    Comment: Test not performed    Result canceled by the ancillary.     • Lymphocyte Rel % 11/07/2019 CANCELED   Final-Edited    Comment: Test not performed    Result canceled by the ancillary.     • Monocyte Rel % 11/07/2019 CANCELED   Final-Edited    Comment: Test not performed    Result canceled by the ancillary.     • Eosinophil Rel % 11/07/2019 CANCELED   Final-Edited    Comment: Test not performed    Result canceled by the ancillary.     • Lymphocytes Absolute 11/07/2019 CANCELED   Final-Edited    Comment: Test not performed    Result canceled by the ancillary.     • Eosinophils Absolute 11/07/2019 CANCELED   Final-Edited    Comment: Test not performed    Result canceled by the ancillary.     • Basophils Absolute 11/07/2019 CANCELED   Final-Edited    Comment: Test not performed    Result canceled by the ancillary.     • Neutrophil Rel % 11/07/2019 60.0  42.7 - 76.0 % Final   • Lymphocyte Rel % 11/07/2019 27.4  19.6 - 45.3 % Final   • Monocyte Rel % 11/07/2019 4.2* 5.0 - 12.0 % Final   • Eosinophil Rel % 11/07/2019 7.4* 0.3 - 6.2 % Final   • Basophil Rel % 11/07/2019 1.1  0.0 - 1.5 % Final   • Neutrophils Absolute 11/07/2019 2.65  1.70 - 7.00 10*3/mm3 Final   • Lymphocytes Absolute 11/07/2019 1.21  0.70 - 3.10 10*3/mm3 Final   • Monocytes Absolute 11/07/2019 0.19  0.10 - 0.90 10*3/mm3 Final   • Eosinophil Abs 11/07/2019 0.33  0.00 - 0.40 10*3/mm3 Final   • Basophils Absolute 11/07/2019 0.05  0.00 - 0.20 10*3/mm3 Final   • Differential Comment 11/07/2019 Comment   Final    WBC MORPHOLOGY               Normal                      Normal     N   • Comment 11/07/2019 Comment   Final    RBC MORPHOLOGY               Normal                       Normal     N   • Plt Comment 11/07/2019 Comment   Final    PLATELET MORPHOLOGY          Normal                      Normal     N       Current Outpatient Medications:   •  atorvastatin (LIPITOR) 40 MG tablet, TAKE 1 TABLET DAILY, Disp: 90 tablet, Rfl: 1  •  calcium (OS-ELIZABETH) 600 MG tablet, Take  by mouth daily., Disp: , Rfl:   •  carvedilol (COREG) 6.25 MG tablet, Take 1 tablet by mouth 2 (Two) Times a Day., Disp: , Rfl:   •  Cholecalciferol (VITAMIN D3) 2000 UNITS tablet, Take 1 capsule by mouth., Disp: , Rfl:   •  Coenzyme Q10 (CO Q 10) 100 MG capsule, Take 1 capsule by mouth Daily., Disp: , Rfl:   •  ENTRESTO 49-51 MG tablet, Take 1 tablet by mouth 2 (Two) Times a Day., Disp: , Rfl:   •  levothyroxine (SYNTHROID, LEVOTHROID) 100 MCG tablet, TAKE 1 TABLET DAILY, Disp: 90 tablet, Rfl: 1  •  naproxen sodium (ALEVE) 220 MG tablet, Take 220 mg by mouth 2 (Two) Times a Day With Meals., Disp: , Rfl:   •  omeprazole (priLOSEC) 20 MG capsule, TAKE 1 CAPSULE DAILY, Disp: 90 capsule, Rfl: 1  •  potassium chloride (K-DUR,KLOR-CON) 20 MEQ CR tablet, TAKE 1 TABLET DAILY, Disp: 90 tablet, Rfl: 1  •  SIMBRINZA 1-0.2 % suspension, 1 drop 3 (Three) Times a Day., Disp: , Rfl:   •  Timolol Hemihydrate (BETIMOL OP), Apply 1 drop to eye(s) as directed by provider Daily., Disp: , Rfl:   •  travoprost, PAUL free, (TRAVATAN) 0.004 % solution ophthalmic solution, 1 drop Every Night. in affected eye(s), Disp: , Rfl:   •  amitriptyline (ELAVIL) 10 MG tablet, TAKE 1 TABLET EVERY NIGHT, Disp: 90 tablet, Rfl: 3         Assessment/Plan   Diagnoses and all orders for this visit:    Essential hypertension    Hyperlipidemia, unspecified hyperlipidemia type    Gastroesophageal reflux disease, esophagitis presence not specified    Type 2 diabetes mellitus without complication, unspecified whether long term insulin use (CMS/[facility])    Other specified hypothyroidism    Medicare annual wellness visit, subsequent      1.  DM-  Ok without meds  2.   HTN- ok with current meds  3. HPL- ok with lipitor  4.  CHF- stable with current meds  5.  Hypothyroidism- ok with current

## 2019-11-14 NOTE — PATIENT INSTRUCTIONS
Medicare Wellness  Personal Prevention Plan of Service     Date of Office Visit:  2019  Encounter Provider:  Any Carbajal MD  Place of Service:  [facility] INTERNAL MEDICINE  Patient Name: Jessy Eubanks  :  1934    As part of the Medicare Wellness portion of your visit today, we are providing you with this personalized preventive plan of services (PPPS). This plan is based upon recommendations of the [place] Preventive Services Task Force (USPSTF) and the Advisory Committee on Immunization Practices (ACIP).    This lists the preventive care services that should be considered, and provides dates of when you are due. Items listed as completed are up-to-date and do not require any further intervention.    Health Maintenance   Topic Date Due   • TDAP/TD VACCINES (1 - Tdap) 10/07/1953   • ZOSTER VACCINE (2 of 3) 2012   • INFLUENZA VACCINE  2019   • MEDICARE ANNUAL WELLNESS  10/30/2019   • HEMOGLOBIN A1C  2020   • DIABETIC EYE EXAM  2020   • LIPID PANEL  2020   • PNEUMOCOCCAL VACCINES (65+ LOW/MEDIUM RISK)  Completed   • MAMMOGRAM  Discontinued   • URINE MICROALBUMIN  Discontinued   • DXA SCAN  Discontinued       No orders of the defined types were placed in this encounter.      No Follow-up on file.          Fall Prevention in the Home, Adult  Falls can cause injuries. They can happen to people of all ages. There are many things you can do to make your home safe and to help prevent falls. Ask for help when making these changes, if needed.  What actions can I take to prevent falls?  General Instructions  · Use good lighting in all rooms. Replace any light bulbs that burn out.  · Turn on the lights when you go into a dark area. Use night-lights.  · Keep items that you use often in easy-to-reach places. Lower the shelves around your home if necessary.  · Set up your furniture so you have a clear path. Avoid moving your furniture around.  · Do not have  throw rugs and other things on the floor that can make you trip.  · Avoid walking on wet floors.  · If any of your floors are uneven, fix them.  · Add color or contrast paint or tape to clearly riky and help you see:  ? Any grab bars or handrails.  ? First and last steps of stairways.  ? Where the edge of each step is.  · If you use a stepladder:  ? Make sure that it is fully opened. Do not climb a closed stepladder.  ? Make sure that both sides of the stepladder are locked into place.  ? Ask someone to hold the stepladder for you while you use it.  · If there are any pets around you, be aware of where they are.  What can I do in the bathroom?         · Keep the floor dry. Clean up any water that spills onto the floor as soon as it happens.  · Remove soap buildup in the tub or shower regularly.  · Use non-skid mats or decals on the floor of the tub or shower.  · Attach bath mats securely with double-sided, non-slip rug tape.  · If you need to sit down in the shower, use a plastic, non-slip stool.  · Install grab bars by the toilet and in the tub and shower. Do not use towel bars as grab bars.  What can I do in the bedroom?  · Make sure that you have a light by your bed that is easy to reach.  · Do not use any sheets or blankets that are too big for your bed. They should not hang down onto the floor.  · Have a firm chair that has side arms. You can use this for support while you get dressed.  What can I do in the kitchen?  · Clean up any spills right away.  · If you need to reach something above you, use a strong step stool that has a grab bar.  · Keep electrical cords out of the way.  · Do not use floor polish or wax that makes floors slippery. If you must use wax, use non-skid floor wax.  What can I do with my stairs?  · Do not leave any items on the stairs.  · Make sure that you have a light switch at the top of the stairs and the bottom of the stairs. If you do not have them, ask someone to add them for  you.  · Make sure that there are handrails on both sides of the stairs, and use them. Fix handrails that are broken or loose. Make sure that handrails are as long as the stairways.  · Install non-slip stair treads on all stairs in your home.  · Avoid having throw rugs at the top or bottom of the stairs. If you do have throw rugs, attach them to the floor with carpet tape.  · Choose a carpet that does not hide the edge of the steps on the stairway.  · Check any carpeting to make sure that it is firmly attached to the stairs. Fix any carpet that is loose or worn.  What can I do on the outside of my home?  · Use bright outdoor lighting.  · Regularly fix the edges of walkways and driveways and fix any cracks.  · Remove anything that might make you trip as you walk through a door, such as a raised step or threshold.  · Trim any bushes or trees on the path to your home.  · Regularly check to see if handrails are loose or broken. Make sure that both sides of any steps have handrails.  · Install guardrails along the edges of any raised decks and porches.  · Clear walking paths of anything that might make someone trip, such as tools or rocks.  · Have any leaves, snow, or ice cleared regularly.  · Use sand or salt on walking paths during winter.  · Clean up any spills in your garage right away. This includes grease or oil spills.  What other actions can I take?  · Wear shoes that:  ? Have a low heel. Do not wear high heels.  ? Have rubber bottoms.  ? Are comfortable and fit you well.  ? Are closed at the toe. Do not wear open-toe sandals.  · Use tools that help you move around (mobility aids) if they are needed. These include:  ? Canes.  ? Walkers.  ? Scooters.  ? Crutches.  · Review your medicines with your doctor. Some medicines can make you feel dizzy. This can increase your chance of falling.  Ask your doctor what other things you can do to help prevent falls.  Where to find more information  · Centers for Disease Control  and Prevention, STEADI: https://cdc.gov  · National [place] on Aging: https://kn6xmjf.cipriano.nih.gov  Contact a doctor if:  · You are afraid of falling at home.  · You feel weak, drowsy, or dizzy at home.  · You fall at home.  Summary  · There are many simple things that you can do to make your home safe and to help prevent falls.  · Ways to make your home safe include removing tripping hazards and installing grab bars in the bathroom.  · Ask for help when making these changes in your home.  This information is not intended to replace advice given to you by your health care provider. Make sure you discuss any questions you have with your health care provider.  Document Released: 10/14/2010 Document Revised: 08/02/2018 Document Reviewed: 08/02/2018  Elsevier Interactive Patient Education © 2019 [employer]

## 2019-12-30 RX ORDER — OMEPRAZOLE 20 MG/1
CAPSULE, DELAYED RELEASE ORAL
Qty: 90 CAPSULE | Refills: 3 | Status: SHIPPED | OUTPATIENT
Start: 2019-12-30 | End: 2020-12-04

## 2020-02-24 RX ORDER — LEVOTHYROXINE SODIUM 0.1 MG/1
TABLET ORAL
Qty: 90 TABLET | Refills: 3 | Status: SHIPPED | OUTPATIENT
Start: 2020-02-24 | End: 2021-03-30

## 2020-03-26 ENCOUNTER — DOCUMENTATION (OUTPATIENT)
Dept: PHYSICAL THERAPY | Facility: CLINIC | Age: 85
End: 2020-03-26

## 2020-03-26 DIAGNOSIS — M48.061 DEGENERATIVE LUMBAR SPINAL STENOSIS: ICD-10-CM

## 2020-03-26 DIAGNOSIS — M51.36 DDD (DEGENERATIVE DISC DISEASE), LUMBAR: ICD-10-CM

## 2020-03-26 DIAGNOSIS — M54.30 SCIATICA, UNSPECIFIED LATERALITY: ICD-10-CM

## 2020-03-26 DIAGNOSIS — M54.42 CHRONIC LEFT-SIDED LOW BACK PAIN WITH LEFT-SIDED SCIATICA: ICD-10-CM

## 2020-03-26 DIAGNOSIS — G89.29 CHRONIC LEFT-SIDED LOW BACK PAIN WITH LEFT-SIDED SCIATICA: ICD-10-CM

## 2020-03-26 DIAGNOSIS — M54.16 RADICULOPATHY, LUMBAR REGION: Primary | ICD-10-CM

## 2020-03-26 NOTE — PROGRESS NOTES
Orthopedic / Sports / Industrial Physical Therapy    Discharge Summary  Discharge Summary from Physical Therapy Report    Patient: Jessy Eubanks     :  1934  Referring Physician:  No ref. provider found    Dates  PT visit: 2019 thru 2019   Number of Visits: 9    Discharge Status of Patient: Pt did not return after 2019 appointment and cancelled her remaining 4 appointments thru 2019 on same day as appointments - Had discussed her seeking assessment by spine / neuro surgeon due to persistent and increasing radicular symptoms and weakness in LE.     Goals: Partially Met    Prognosis:  Pt would benefit from further assessment by Spine / Neuro Surgeon due to persistent and increasing LBP and LE Radicular symptoms including weakness in LE -       DISCHARGE PLAN:  DC PT -     Date of Discharge:  2019      Luis Cochran PT  Physical Therapist  ______________________________________________________________________  [address] [place] [address]  Phone: [phone] Fax: [phone]

## 2020-04-16 RX ORDER — POTASSIUM CHLORIDE 20 MEQ/1
TABLET, EXTENDED RELEASE ORAL
Qty: 90 TABLET | Refills: 3 | Status: SHIPPED | OUTPATIENT
Start: 2020-04-16 | End: 2021-05-24

## 2020-05-14 ENCOUNTER — RESULTS ENCOUNTER (OUTPATIENT)
Dept: INTERNAL MEDICINE | Facility: CLINIC | Age: 85
End: 2020-05-14

## 2020-05-14 DIAGNOSIS — E78.5 HYPERLIPIDEMIA, UNSPECIFIED HYPERLIPIDEMIA TYPE: ICD-10-CM

## 2020-05-14 DIAGNOSIS — I10 ESSENTIAL HYPERTENSION: ICD-10-CM

## 2020-05-14 DIAGNOSIS — E11.9 TYPE 2 DIABETES MELLITUS WITHOUT COMPLICATION, UNSPECIFIED WHETHER LONG TERM INSULIN USE (HCC): ICD-10-CM

## 2020-05-15 LAB
ALBUMIN SERPL-MCNC: 3.8 G/DL (ref 3.5–5.2)
ALBUMIN/GLOB SERPL: 1.7 G/DL
ALP SERPL-CCNC: 60 U/L (ref 39–117)
ALT SERPL-CCNC: 13 U/L (ref 1–33)
AST SERPL-CCNC: 15 U/L (ref 1–32)
BASOPHILS # BLD AUTO: 0.05 10*3/MM3 (ref 0–0.2)
BASOPHILS NFR BLD AUTO: 1.2 % (ref 0–1.5)
BILIRUB SERPL-MCNC: 0.6 MG/DL (ref 0.2–1.2)
BUN SERPL-MCNC: 18 MG/DL (ref 8–23)
BUN/CREAT SERPL: 24.7 (ref 7–25)
CALCIUM SERPL-MCNC: 9.2 MG/DL (ref 8.6–10.5)
CHLORIDE SERPL-SCNC: 107 MMOL/L (ref 98–107)
CHOLEST SERPL-MCNC: 143 MG/DL (ref 0–200)
CO2 SERPL-SCNC: 30.5 MMOL/L (ref 22–29)
CREAT SERPL-MCNC: 0.73 MG/DL (ref 0.57–1)
EOSINOPHIL # BLD AUTO: 0.16 10*3/MM3 (ref 0–0.4)
EOSINOPHIL NFR BLD AUTO: 3.7 % (ref 0.3–6.2)
ERYTHROCYTE [DISTWIDTH] IN BLOOD BY AUTOMATED COUNT: 14.2 % (ref 12.3–15.4)
GLOBULIN SER CALC-MCNC: 2.2 GM/DL
GLUCOSE SERPL-MCNC: 125 MG/DL (ref 65–99)
HBA1C MFR BLD: 6 % (ref 4.8–5.6)
HCT VFR BLD AUTO: 36.9 % (ref 34–46.6)
HDLC SERPL-MCNC: 58 MG/DL (ref 40–60)
HGB BLD-MCNC: 11.8 G/DL (ref 12–15.9)
IMM GRANULOCYTES # BLD AUTO: 0 10*3/MM3 (ref 0–0.05)
IMM GRANULOCYTES NFR BLD AUTO: 0 % (ref 0–0.5)
LDLC SERPL CALC-MCNC: 73 MG/DL (ref 0–100)
LDLC/HDLC SERPL: 1.26 {RATIO}
LYMPHOCYTES # BLD AUTO: 1.77 10*3/MM3 (ref 0.7–3.1)
LYMPHOCYTES NFR BLD AUTO: 41.3 % (ref 19.6–45.3)
MCH RBC QN AUTO: 28.9 PG (ref 26.6–33)
MCHC RBC AUTO-ENTMCNC: 32 G/DL (ref 31.5–35.7)
MCV RBC AUTO: 90.2 FL (ref 79–97)
MONOCYTES # BLD AUTO: 0.38 10*3/MM3 (ref 0.1–0.9)
MONOCYTES NFR BLD AUTO: 8.9 % (ref 5–12)
NEUTROPHILS # BLD AUTO: 1.93 10*3/MM3 (ref 1.7–7)
NEUTROPHILS NFR BLD AUTO: 44.9 % (ref 42.7–76)
NRBC BLD AUTO-RTO: 0 /100 WBC (ref 0–0.2)
PLATELET # BLD AUTO: 207 10*3/MM3 (ref 140–450)
POTASSIUM SERPL-SCNC: 3.7 MMOL/L (ref 3.5–5.2)
PROT SERPL-MCNC: 6 G/DL (ref 6–8.5)
RBC # BLD AUTO: 4.09 10*6/MM3 (ref 3.77–5.28)
SODIUM SERPL-SCNC: 146 MMOL/L (ref 136–145)
TRIGL SERPL-MCNC: 60 MG/DL (ref 0–150)
TSH SERPL DL<=0.005 MIU/L-ACNC: 0.75 UIU/ML (ref 0.27–4.2)
VLDLC SERPL CALC-MCNC: 12 MG/DL
WBC # BLD AUTO: 4.29 10*3/MM3 (ref 3.4–10.8)

## 2020-05-22 ENCOUNTER — OFFICE VISIT (OUTPATIENT)
Dept: INTERNAL MEDICINE | Facility: CLINIC | Age: 85
End: 2020-05-22

## 2020-05-22 VITALS
BODY MASS INDEX: 29.57 KG/M2 | TEMPERATURE: 98 F | HEART RATE: 67 BPM | HEIGHT: 60 IN | OXYGEN SATURATION: 96 % | DIASTOLIC BLOOD PRESSURE: 62 MMHG | SYSTOLIC BLOOD PRESSURE: 114 MMHG | WEIGHT: 150.6 LBS

## 2020-05-22 DIAGNOSIS — E11.9 TYPE 2 DIABETES MELLITUS WITHOUT COMPLICATION, UNSPECIFIED WHETHER LONG TERM INSULIN USE (HCC): ICD-10-CM

## 2020-05-22 DIAGNOSIS — I10 ESSENTIAL HYPERTENSION: ICD-10-CM

## 2020-05-22 DIAGNOSIS — K21.9 GASTROESOPHAGEAL REFLUX DISEASE, ESOPHAGITIS PRESENCE NOT SPECIFIED: ICD-10-CM

## 2020-05-22 DIAGNOSIS — Z00.00 HEALTH CARE MAINTENANCE: Primary | ICD-10-CM

## 2020-05-22 DIAGNOSIS — I50.22 CHRONIC SYSTOLIC CONGESTIVE HEART FAILURE (HCC): ICD-10-CM

## 2020-05-22 DIAGNOSIS — E78.5 HYPERLIPIDEMIA, UNSPECIFIED HYPERLIPIDEMIA TYPE: ICD-10-CM

## 2020-05-22 PROCEDURE — 99397 PER PM REEVAL EST PAT 65+ YR: CPT | Performed by: INTERNAL MEDICINE

## 2020-05-22 RX ORDER — AMITRIPTYLINE HYDROCHLORIDE 10 MG/1
10 TABLET, FILM COATED ORAL NIGHTLY
Qty: 90 TABLET | Refills: 3 | Status: SHIPPED | OUTPATIENT
Start: 2020-05-22 | End: 2021-06-03

## 2020-05-22 NOTE — PROGRESS NOTES
Subjective   Jessy Eubanks is a 85 y.o. female and is here for a comprehensive physical exam. The patient reports problems - htn.  Pt has been taking BP meds as prescribed without any problems.  No HA  No episodes of orthostasis  Pt has been taking cholesterol meds as prescribed.  No difficulties with myalgias.   Pt has been compliant with meds for GERD.  No sx as long as pt takes medicine as prescribed.  No epigastric pain or reflux sx  She says the chf is well controlled      Pt is UTD with annual gyn exam and mammo utd     Do you take any herbs or supplements that were not prescribed by a doctor? See list      Social History: no tob no etoh  Social History     Socioeconomic History   • Marital status:      Spouse name: Theodore Eubanks   • Number of children: 3   • Years of education: Not on file   • Highest education level: Not on file   Occupational History   • Occupation:    Tobacco Use   • Smoking status: Never Smoker   Substance and Sexual Activity   • Alcohol use: No   • Drug use: No   Social History Narrative    SHe has several grandchildren and great grandchildren here       Family History:   Family History   Problem Relation Age of Onset   • Hypertension Mother    • Thyroid disease Mother    • Stroke Maternal Grandfather    • Colon cancer Daughter        Past Medical History:   Past Medical History:   Diagnosis Date   • Abnormal mammogram    • Carotid artery stenosis    • CHF (congestive heart failure) (CMS/HCC)    • Diabetes mellitus (CMS/HCC)    • GERD (gastroesophageal reflux disease)    • HLD (hyperlipidemia)    • HTN (hypertension)    • Hypertension    • Hypokalemia    • Hypothyroidism    • Osteopenia    • Postmenopausal    • RLS (restless legs syndrome)    • Sciatica    • Trigger finger    • Type 2 diabetes mellitus (CMS/HCC)    • Vitamin B12 deficiency    • Vitamin D deficiency            Review of Systems    A comprehensive review of systems was negative.    Objective   BP  "114/62 (BP Location: Left arm, Patient Position: Sitting, Cuff Size: Adult)   Pulse 67   Temp 98 °F (36.7 °C) (Oral)   Ht 153 cm (60.24\")   Wt 68.3 kg (150 lb 9.6 oz)   SpO2 96%   BMI 29.18 kg/m²     General Appearance:    Alert, cooperative, no distress, appears stated age   Head:    Normocephalic, without obvious abnormality, atraumatic   Eyes:    PERRL, conjunctiva/corneas clear, EOM's intact, fundi     benign, both eyes   Ears:    Normal TM's and external ear canals, both ears   Nose:   Nares normal, septum midline, mucosa normal, no drainage    or sinus tenderness   Throat:   Lips, mucosa, and tongue normal; teeth and gums normal   Neck:   Supple, symmetrical, trachea midline, no adenopathy;     thyroid:  no enlargement/tenderness/nodules; no carotid    bruit or JVD   Back:     Symmetric, no curvature, ROM normal, no CVA tenderness   Lungs:     Clear to auscultation bilaterally, respirations unlabored   Chest Wall:    No tenderness or deformity    Heart:    Regular rate and rhythm, S1 and S2 normal, no murmur, rub   or gallop       Abdomen:     Soft, non-tender, bowel sounds active all four quadrants,     no masses, no organomegaly           Extremities:   Extremities normal, atraumatic, no cyanosis or edema   Pulses:   2+ and symmetric all extremities   Skin:   Skin color, texture, turgor normal, no rashes or lesions   Lymph nodes:   Cervical, supraclavicular, and axillary nodes normal   Neurologic:   CNII-XII intact, normal strength, sensation and reflexes     throughout       Vitals:    05/22/20 1252   BP: 114/62   Pulse: 67   Temp: 98 °F (36.7 °C)   SpO2: 96%     Body mass index is 29.18 kg/m².      Medications:   Current Outpatient Medications:   •  amitriptyline (ELAVIL) 10 MG tablet, Take 1 tablet by mouth Every Night., Disp: 90 tablet, Rfl: 3  •  atorvastatin (LIPITOR) 40 MG tablet, TAKE 1 TABLET DAILY, Disp: 90 tablet, Rfl: 1  •  calcium (OS-ELIZABETH) 600 MG tablet, Take  by mouth daily., Disp: , Rfl: "   •  carvedilol (COREG) 6.25 MG tablet, Take 1 tablet by mouth 2 (Two) Times a Day., Disp: , Rfl:   •  Cholecalciferol (VITAMIN D3) 2000 UNITS tablet, Take 1 capsule by mouth., Disp: , Rfl:   •  Coenzyme Q10 (CO Q 10) 100 MG capsule, Take 1 capsule by mouth Daily., Disp: , Rfl:   •  ENTRESTO 49-51 MG tablet, Take 1 tablet by mouth 2 (Two) Times a Day., Disp: , Rfl:   •  levothyroxine (SYNTHROID, LEVOTHROID) 100 MCG tablet, TAKE 1 TABLET DAILY, Disp: 90 tablet, Rfl: 3  •  naproxen sodium (ALEVE) 220 MG tablet, Take 220 mg by mouth 2 (Two) Times a Day With Meals., Disp: , Rfl:   •  omeprazole (priLOSEC) 20 MG capsule, TAKE 1 CAPSULE DAILY, Disp: 90 capsule, Rfl: 3  •  potassium chloride (K-DUR,KLOR-CON) 20 MEQ CR tablet, TAKE 1 TABLET DAILY, Disp: 90 tablet, Rfl: 3  •  SIMBRINZA 1-0.2 % suspension, 1 drop 3 (Three) Times a Day., Disp: , Rfl:   •  Timolol Hemihydrate (BETIMOL OP), Apply 1 drop to eye(s) as directed by provider Daily., Disp: , Rfl:   •  travoprost, PAUL free, (TRAVATAN) 0.004 % solution ophthalmic solution, 1 drop Every Night. in affected eye(s), Disp: , Rfl:        Assessment/Plan   Healthy female exam.      1. Healthcare Maintenance:  2. Patient Counseling:  --Nutrition: Stressed importance of moderation in sodium/caffeine intake, saturated fat and cholesterol, caloric balance, sufficient intake of fresh fruits, vegetables, fiber, calcium and vit D  --Exercise: . She is walking a little every day  --Substance Abuse: no tob no etoh  --Dental health: Patient does go to the dentist regularly  --Immunizations reviewed.  I have recommended shingles vaccine    3. HTN- ok with meds  4.  HPL- ok with current meds  5 insomnia-  Helped with elavil  6.  GERD- ok with current omeprazole  7.  CHF- stable with cards

## 2020-05-22 NOTE — PROGRESS NOTES
Subjective   Jessy Eubanks is a 85 y.o. female here to follow up on hypothyroidism, hpl with labs.    History of Present Illness   Pt has been taking BP meds as prescribed without any problems.  No HA  No episodes of orthostasis  Pt has been taking cholesterol meds as prescribed.  No difficulties with myalgias.   Pt has been compliant with meds for GERD.  No sx as long as pt takes medicine as prescribed.  No epigastric pain or reflux sx  She says the chf is well controlled    The following portions of the patient's history were reviewed and updated as appropriate: allergies, current medications, past medical history, past social history and problem list.  She is walking most days    Review of Systems   All other systems reviewed and are negative.      Objective   Physical Exam   Constitutional: She is oriented to person, place, and time. She appears well-developed and well-nourished.   HENT:   Head: Normocephalic and atraumatic.   Right Ear: External ear normal.   Left Ear: External ear normal.   Mouth/Throat: Oropharynx is clear and moist.   Eyes: Pupils are equal, round, and reactive to light. Conjunctivae and EOM are normal.   Neck: Normal range of motion. No tracheal deviation present. No thyromegaly present.   Cardiovascular: Normal rate, regular rhythm, normal heart sounds and intact distal pulses.   Pulmonary/Chest: Effort normal and breath sounds normal.   Abdominal: Soft. Bowel sounds are normal. She exhibits no distension. There is no tenderness.   Musculoskeletal: Normal range of motion. She exhibits no edema or deformity.   Neurological: She is alert and oriented to person, place, and time.   Skin: Skin is warm and dry.   Psychiatric: She has a normal mood and affect. Her behavior is normal. Judgment and thought content normal.   Vitals reviewed.      Vitals:    05/22/20 1252   BP: 114/62   Pulse: 67   Temp: 98 °F (36.7 °C)   SpO2: 96%     Results Encounter on 05/14/2020   Component Date Value Ref  Range Status   • Glucose 05/15/2020 125* 65 - 99 mg/dL Final   • BUN 05/15/2020 18  8 - 23 mg/dL Final   • Creatinine 05/15/2020 0.73  0.57 - 1.00 mg/dL Final   • eGFR Non African Am 05/15/2020 76  >60 mL/min/1.73 Final    Comment: The MDRD GFR formula is only valid for adults with stable  renal function between ages 18 and 70.     • eGFR  Am 05/15/2020 92  >60 mL/min/1.73 Final   • BUN/Creatinine Ratio 05/15/2020 24.7  7.0 - 25.0 Final   • Sodium 05/15/2020 146* 136 - 145 mmol/L Final   • Potassium 05/15/2020 3.7  3.5 - 5.2 mmol/L Final   • Chloride 05/15/2020 107  98 - 107 mmol/L Final   • Total CO2 05/15/2020 30.5* 22.0 - 29.0 mmol/L Final   • Calcium 05/15/2020 9.2  8.6 - 10.5 mg/dL Final   • Total Protein 05/15/2020 6.0  6.0 - 8.5 g/dL Final   • Albumin 05/15/2020 3.80  3.50 - 5.20 g/dL Final   • Globulin 05/15/2020 2.2  gm/dL Final   • A/G Ratio 05/15/2020 1.7  g/dL Final   • Total Bilirubin 05/15/2020 0.6  0.2 - 1.2 mg/dL Final   • Alkaline Phosphatase 05/15/2020 60  39 - 117 U/L Final   • AST (SGOT) 05/15/2020 15  1 - 32 U/L Final   • ALT (SGPT) 05/15/2020 13  1 - 33 U/L Final   • Total Cholesterol 05/15/2020 143  0 - 200 mg/dL Final   • Triglycerides 05/15/2020 60  0 - 150 mg/dL Final   • HDL Cholesterol 05/15/2020 58  40 - 60 mg/dL Final   • VLDL Cholesterol 05/15/2020 12  mg/dL Final   • LDL Cholesterol  05/15/2020 73  0 - 100 mg/dL Final   • LDL/HDL Ratio 05/15/2020 1.26   Final   • TSH 05/15/2020 0.749  0.270 - 4.200 uIU/mL Final   • WBC 05/15/2020 4.29  3.40 - 10.80 10*3/mm3 Final   • RBC 05/15/2020 4.09  3.77 - 5.28 10*6/mm3 Final   • Hemoglobin 05/15/2020 11.8* 12.0 - 15.9 g/dL Final   • Hematocrit 05/15/2020 36.9  34.0 - 46.6 % Final   • MCV 05/15/2020 90.2  79.0 - 97.0 fL Final   • MCH 05/15/2020 28.9  26.6 - 33.0 pg Final   • MCHC 05/15/2020 32.0  31.5 - 35.7 g/dL Final   • RDW 05/15/2020 14.2  12.3 - 15.4 % Final   • Platelets 05/15/2020 207  140 - 450 10*3/mm3 Final   • Neutrophil Rel %  05/15/2020 44.9  42.7 - 76.0 % Final   • Lymphocyte Rel % 05/15/2020 41.3  19.6 - 45.3 % Final   • Monocyte Rel % 05/15/2020 8.9  5.0 - 12.0 % Final   • Eosinophil Rel % 05/15/2020 3.7  0.3 - 6.2 % Final   • Basophil Rel % 05/15/2020 1.2  0.0 - 1.5 % Final   • Neutrophils Absolute 05/15/2020 1.93  1.70 - 7.00 10*3/mm3 Final   • Lymphocytes Absolute 05/15/2020 1.77  0.70 - 3.10 10*3/mm3 Final   • Monocytes Absolute 05/15/2020 0.38  0.10 - 0.90 10*3/mm3 Final   • Eosinophils Absolute 05/15/2020 0.16  0.00 - 0.40 10*3/mm3 Final   • Basophils Absolute 05/15/2020 0.05  0.00 - 0.20 10*3/mm3 Final   • Immature Granulocyte Rel % 05/15/2020 0.0  0.0 - 0.5 % Final   • Immature Grans Absolute 05/15/2020 0.00  0.00 - 0.05 10*3/mm3 Final   • nRBC 05/15/2020 0.0  0.0 - 0.2 /100 WBC Final   • Hemoglobin A1C 05/15/2020 6.00* 4.80 - 5.60 % Final    Comment: Hemoglobin A1C Ranges:  Increased Risk for Diabetes  5.7% to 6.4%  Diabetes                     >= 6.5%  Diabetic Goal                < 7.0%       Current Outpatient Medications:   •  amitriptyline (ELAVIL) 10 MG tablet, Take 1 tablet by mouth Every Night., Disp: 90 tablet, Rfl: 3  •  atorvastatin (LIPITOR) 40 MG tablet, TAKE 1 TABLET DAILY, Disp: 90 tablet, Rfl: 1  •  calcium (OS-ELIZABETH) 600 MG tablet, Take  by mouth daily., Disp: , Rfl:   •  carvedilol (COREG) 6.25 MG tablet, Take 1 tablet by mouth 2 (Two) Times a Day., Disp: , Rfl:   •  Cholecalciferol (VITAMIN D3) 2000 UNITS tablet, Take 1 capsule by mouth., Disp: , Rfl:   •  Coenzyme Q10 (CO Q 10) 100 MG capsule, Take 1 capsule by mouth Daily., Disp: , Rfl:   •  ENTRESTO 49-51 MG tablet, Take 1 tablet by mouth 2 (Two) Times a Day., Disp: , Rfl:   •  levothyroxine (SYNTHROID, LEVOTHROID) 100 MCG tablet, TAKE 1 TABLET DAILY, Disp: 90 tablet, Rfl: 3  •  naproxen sodium (ALEVE) 220 MG tablet, Take 220 mg by mouth 2 (Two) Times a Day With Meals., Disp: , Rfl:   •  omeprazole (priLOSEC) 20 MG capsule, TAKE 1 CAPSULE DAILY, Disp: 90  capsule, Rfl: 3  •  potassium chloride (K-DUR,KLOR-CON) 20 MEQ CR tablet, TAKE 1 TABLET DAILY, Disp: 90 tablet, Rfl: 3  •  SIMBRINZA 1-0.2 % suspension, 1 drop 3 (Three) Times a Day., Disp: , Rfl:   •  Timolol Hemihydrate (BETIMOL OP), Apply 1 drop to eye(s) as directed by provider Daily., Disp: , Rfl:   •  travoprost, PAUL free, (TRAVATAN) 0.004 % solution ophthalmic solution, 1 drop Every Night. in affected eye(s), Disp: , Rfl:     Body mass index is 29.18 kg/m².         Assessment/Plan   Diagnoses and all orders for this visit:    Essential hypertension  -     Comprehensive Metabolic Panel; Future  -     Hemoglobin A1c; Future  -     CBC & Differential; Future  -     LP+LDL / HDL Ratio ([facility]); Future  -     TSH Rfx On Abnormal To Free T4; Future    Hyperlipidemia, unspecified hyperlipidemia type  -     Comprehensive Metabolic Panel; Future  -     Hemoglobin A1c; Future  -     CBC & Differential; Future  -     LP+LDL / HDL Ratio ([facility]); Future  -     TSH Rfx On Abnormal To Free T4; Future    Gastroesophageal reflux disease, esophagitis presence not specified  -     Comprehensive Metabolic Panel; Future  -     Hemoglobin A1c; Future  -     CBC & Differential; Future  -     LP+LDL / HDL Ratio ([facility]); Future  -     TSH Rfx On Abnormal To Free T4; Future    Chronic systolic congestive heart failure (CMS/HCC)    Type 2 diabetes mellitus without complication, unspecified whether long term insulin use (CMS/[facility])  -     Comprehensive Metabolic Panel; Future  -     Hemoglobin A1c; Future  -     CBC & Differential; Future  -     LP+LDL / HDL Ratio ([facility]); Future  -     TSH Rfx On Abnormal To Free T4; Future    Other orders  -     amitriptyline (ELAVIL) 10 MG tablet; Take 1 tablet by mouth Every Night.      1. HTN- ok with meds  2.  HPL- ok with current meds  3 insomnia-  Helped with elavil  4.  GERD- ok with current omeprazole  5.  CHF- stable with cards

## 2020-07-06 RX ORDER — ATORVASTATIN CALCIUM 40 MG/1
TABLET, FILM COATED ORAL
Qty: 90 TABLET | Refills: 3 | Status: SHIPPED | OUTPATIENT
Start: 2020-07-06 | End: 2021-05-19

## 2020-11-11 DIAGNOSIS — I10 ESSENTIAL HYPERTENSION: ICD-10-CM

## 2020-11-11 DIAGNOSIS — K21.9 GASTROESOPHAGEAL REFLUX DISEASE: ICD-10-CM

## 2020-11-11 DIAGNOSIS — E78.5 HYPERLIPIDEMIA, UNSPECIFIED HYPERLIPIDEMIA TYPE: ICD-10-CM

## 2020-11-11 DIAGNOSIS — E11.9 TYPE 2 DIABETES MELLITUS WITHOUT COMPLICATION, UNSPECIFIED WHETHER LONG TERM INSULIN USE (HCC): ICD-10-CM

## 2020-11-13 ENCOUNTER — LAB (OUTPATIENT)
Dept: LAB | Facility: HOSPITAL | Age: 85
End: 2020-11-13

## 2020-11-13 ENCOUNTER — TRANSCRIBE ORDERS (OUTPATIENT)
Dept: ADMINISTRATIVE | Facility: HOSPITAL | Age: 85
End: 2020-11-13

## 2020-11-13 DIAGNOSIS — H44.003 EYE INFECTION, BILATERAL: Primary | ICD-10-CM

## 2020-11-13 DIAGNOSIS — H44.003 EYE INFECTION, BILATERAL: ICD-10-CM

## 2020-11-13 LAB
ALBUMIN SERPL-MCNC: 3.7 G/DL (ref 3.5–5.2)
ALBUMIN/GLOB SERPL: 1.8 G/DL
ALP SERPL-CCNC: 74 U/L (ref 39–117)
ALT SERPL-CCNC: 13 U/L (ref 1–33)
AST SERPL-CCNC: 17 U/L (ref 1–32)
BASOPHILS # BLD AUTO: 0.07 10*3/MM3 (ref 0–0.2)
BASOPHILS NFR BLD AUTO: 1.2 % (ref 0–1.5)
BILIRUB SERPL-MCNC: 0.4 MG/DL (ref 0–1.2)
BUN SERPL-MCNC: 19 MG/DL (ref 8–23)
BUN/CREAT SERPL: 26.8 (ref 7–25)
CALCIUM SERPL-MCNC: 8.6 MG/DL (ref 8.6–10.5)
CHLORIDE SERPL-SCNC: 106 MMOL/L (ref 98–107)
CHOLEST SERPL-MCNC: 148 MG/DL (ref 0–200)
CO2 SERPL-SCNC: 29.7 MMOL/L (ref 22–29)
CREAT SERPL-MCNC: 0.71 MG/DL (ref 0.57–1)
CRP SERPL-MCNC: 0.05 MG/DL (ref 0–0.5)
EOSINOPHIL # BLD AUTO: 0.24 10*3/MM3 (ref 0–0.4)
EOSINOPHIL NFR BLD AUTO: 4.2 % (ref 0.3–6.2)
ERYTHROCYTE [DISTWIDTH] IN BLOOD BY AUTOMATED COUNT: 13.6 % (ref 12.3–15.4)
ERYTHROCYTE [SEDIMENTATION RATE] IN BLOOD: 18 MM/HR (ref 0–30)
GLOBULIN SER CALC-MCNC: 2.1 GM/DL
GLUCOSE SERPL-MCNC: 138 MG/DL (ref 65–99)
HBA1C MFR BLD: 6.1 % (ref 4.8–5.6)
HCT VFR BLD AUTO: 37.3 % (ref 34–46.6)
HDLC SERPL-MCNC: 64 MG/DL (ref 40–60)
HGB BLD-MCNC: 12 G/DL (ref 12–15.9)
IMM GRANULOCYTES # BLD AUTO: 0.01 10*3/MM3 (ref 0–0.05)
IMM GRANULOCYTES NFR BLD AUTO: 0.2 % (ref 0–0.5)
LDLC SERPL CALC-MCNC: 70 MG/DL (ref 0–100)
LDLC/HDLC SERPL: 1.08 {RATIO}
LYMPHOCYTES # BLD AUTO: 1.6 10*3/MM3 (ref 0.7–3.1)
LYMPHOCYTES NFR BLD AUTO: 28 % (ref 19.6–45.3)
MCH RBC QN AUTO: 29.1 PG (ref 26.6–33)
MCHC RBC AUTO-ENTMCNC: 32.2 G/DL (ref 31.5–35.7)
MCV RBC AUTO: 90.3 FL (ref 79–97)
MONOCYTES # BLD AUTO: 0.48 10*3/MM3 (ref 0.1–0.9)
MONOCYTES NFR BLD AUTO: 8.4 % (ref 5–12)
NEUTROPHILS # BLD AUTO: 3.32 10*3/MM3 (ref 1.7–7)
NEUTROPHILS NFR BLD AUTO: 58 % (ref 42.7–76)
NRBC BLD AUTO-RTO: 0 /100 WBC (ref 0–0.2)
PLATELET # BLD AUTO: 229 10*3/MM3 (ref 140–450)
POTASSIUM SERPL-SCNC: 3.8 MMOL/L (ref 3.5–5.2)
PROT SERPL-MCNC: 5.8 G/DL (ref 6–8.5)
RBC # BLD AUTO: 4.13 10*6/MM3 (ref 3.77–5.28)
SODIUM SERPL-SCNC: 143 MMOL/L (ref 136–145)
TRIGL SERPL-MCNC: 74 MG/DL (ref 0–150)
TSH SERPL DL<=0.005 MIU/L-ACNC: 1.71 UIU/ML (ref 0.27–4.2)
VLDLC SERPL CALC-MCNC: 14 MG/DL (ref 5–40)
WBC # BLD AUTO: 5.72 10*3/MM3 (ref 3.4–10.8)

## 2020-11-13 PROCEDURE — 36415 COLL VENOUS BLD VENIPUNCTURE: CPT

## 2020-11-13 PROCEDURE — 85652 RBC SED RATE AUTOMATED: CPT

## 2020-11-13 PROCEDURE — 86038 ANTINUCLEAR ANTIBODIES: CPT

## 2020-11-13 PROCEDURE — 86225 DNA ANTIBODY NATIVE: CPT

## 2020-11-13 PROCEDURE — 82164 ANGIOTENSIN I ENZYME TEST: CPT

## 2020-11-13 PROCEDURE — 86140 C-REACTIVE PROTEIN: CPT

## 2020-11-16 LAB
ANA SER QL: POSITIVE
DSDNA AB SER-ACNC: 10 IU/ML (ref 0–9)
Lab: ABNORMAL

## 2020-11-17 LAB — ACE SERPL-CCNC: 35 U/L (ref 14–82)

## 2020-11-18 LAB — HLA-B27 QL NAA+PROBE: NEGATIVE

## 2020-11-19 ENCOUNTER — OFFICE VISIT (OUTPATIENT)
Dept: INTERNAL MEDICINE | Facility: CLINIC | Age: 85
End: 2020-11-19

## 2020-11-19 VITALS
BODY MASS INDEX: 30.19 KG/M2 | SYSTOLIC BLOOD PRESSURE: 126 MMHG | DIASTOLIC BLOOD PRESSURE: 64 MMHG | OXYGEN SATURATION: 97 % | HEIGHT: 60 IN | WEIGHT: 153.8 LBS | HEART RATE: 51 BPM

## 2020-11-19 DIAGNOSIS — E03.8 OTHER SPECIFIED HYPOTHYROIDISM: ICD-10-CM

## 2020-11-19 DIAGNOSIS — M32.9 LUPUS (HCC): Primary | ICD-10-CM

## 2020-11-19 DIAGNOSIS — E11.9 TYPE 2 DIABETES MELLITUS WITHOUT COMPLICATION, UNSPECIFIED WHETHER LONG TERM INSULIN USE (HCC): ICD-10-CM

## 2020-11-19 DIAGNOSIS — E78.5 HYPERLIPIDEMIA, UNSPECIFIED HYPERLIPIDEMIA TYPE: ICD-10-CM

## 2020-11-19 PROCEDURE — 99214 OFFICE O/P EST MOD 30 MIN: CPT | Performed by: INTERNAL MEDICINE

## 2020-11-19 RX ORDER — DONEPEZIL HYDROCHLORIDE 5 MG/1
5 TABLET, FILM COATED ORAL NIGHTLY
Qty: 30 TABLET | Refills: 1 | Status: SHIPPED | OUTPATIENT
Start: 2020-11-19 | End: 2022-02-28

## 2020-11-19 RX ORDER — PREDNISOLONE ACETATE 10 MG/ML
1 SUSPENSION/ DROPS OPHTHALMIC DAILY
COMMUNITY
Start: 2020-11-12 | End: 2022-02-28

## 2020-11-19 NOTE — PROGRESS NOTES
Subjective   Jessy Eubanks is a 86 y.o. female here today to f/u on hyperlipidemia, HTN, hypothyroidism and DM 2.  Pt c/o memory issues.        History of Present Illness   Pt has been taking BP meds as prescribed without any problems.  No HA  No episodes of orthostasis  Pt has been doing well with thyroid meds.  Taking as perscribed without any complications  Pt has been taking cholesterol meds as prescribed.  No difficulties with myalgias.   She is struggling with memory  She is not sleeping well  She does take alev as needed    The following portions of the patient's history were reviewed and updated as appropriate: allergies, current medications, past medical history, past social history and problem list.  She is eating ok but gets confused easily  She did just get a puppy    Review of Systems   All other systems reviewed and are negative.      Objective   Physical Exam  Vitals signs reviewed.   Constitutional:       Appearance: She is well-developed.   HENT:      Head: Normocephalic and atraumatic.      Right Ear: External ear normal.      Left Ear: External ear normal.   Eyes:      Conjunctiva/sclera: Conjunctivae normal.      Pupils: Pupils are equal, round, and reactive to light.   Neck:      Musculoskeletal: Normal range of motion.      Thyroid: No thyromegaly.      Trachea: No tracheal deviation.   Cardiovascular:      Rate and Rhythm: Normal rate and regular rhythm.      Heart sounds: Murmur (2/6 JOSE RAFAEL RSB) present.   Pulmonary:      Effort: Pulmonary effort is normal.      Breath sounds: Normal breath sounds.   Abdominal:      General: Bowel sounds are normal. There is no distension.      Palpations: Abdomen is soft.      Tenderness: There is no abdominal tenderness.   Musculoskeletal: Normal range of motion.         General: No deformity.   Skin:     General: Skin is warm and dry.   Neurological:      Mental Status: She is alert and oriented to person, place, and time.   Psychiatric:         Behavior:  Behavior normal.         Thought Content: Thought content normal.         Judgment: Judgment normal.         Vitals:    11/19/20 1304   BP: 126/64   Pulse: 51   SpO2: 97%     Body mass index is 29.8 kg/m².         Current Outpatient Medications:   •  atorvastatin (LIPITOR) 40 MG tablet, TAKE 1 TABLET DAILY, Disp: 90 tablet, Rfl: 3  •  calcium (OS-ELIZABETH) 600 MG tablet, Take  by mouth daily., Disp: , Rfl:   •  carvedilol (COREG) 6.25 MG tablet, Take 1 tablet by mouth 2 (Two) Times a Day., Disp: , Rfl:   •  Cholecalciferol (VITAMIN D3) 2000 UNITS tablet, Take 1 capsule by mouth., Disp: , Rfl:   •  Coenzyme Q10 (CO Q 10) 100 MG capsule, Take 1 capsule by mouth Daily., Disp: , Rfl:   •  ENTRESTO 49-51 MG tablet, Take 1 tablet by mouth 2 (Two) Times a Day., Disp: , Rfl:   •  levothyroxine (SYNTHROID, LEVOTHROID) 100 MCG tablet, TAKE 1 TABLET DAILY, Disp: 90 tablet, Rfl: 3  •  naproxen sodium (ALEVE) 220 MG tablet, Take 220 mg by mouth 2 (Two) Times a Day With Meals., Disp: , Rfl:   •  omeprazole (priLOSEC) 20 MG capsule, TAKE 1 CAPSULE DAILY, Disp: 90 capsule, Rfl: 3  •  potassium chloride (K-DUR,KLOR-CON) 20 MEQ CR tablet, TAKE 1 TABLET DAILY, Disp: 90 tablet, Rfl: 3  •  prednisoLONE acetate (PRED FORTE) 1 % ophthalmic suspension, 1 drop Daily., Disp: , Rfl:   •  SIMBRINZA 1-0.2 % suspension, 1 drop 3 (Three) Times a Day., Disp: , Rfl:   •  Timolol Hemihydrate (BETIMOL OP), Apply 1 drop to eye(s) as directed by provider Daily., Disp: , Rfl:   •  travoprost, PAUL free, (TRAVATAN) 0.004 % solution ophthalmic solution, 1 drop Every Night. in affected eye(s), Disp: , Rfl:   •  amitriptyline (ELAVIL) 10 MG tablet, Take 1 tablet by mouth Every Night., Disp: 90 tablet, Rfl: 3  •  donepezil (Aricept) 5 MG tablet, Take 1 tablet by mouth Every Night., Disp: 30 tablet, Rfl: 1      Assessment/Plan   Diagnoses and all orders for this visit:    1. Lupus (CMS/[facility]) (Primary)  -     Ambulatory Referral to Rheumatology    2. Type 2 diabetes  mellitus without complication, unspecified whether long term insulin use (CMS/[facility])    3. Other specified hypothyroidism    4. Hyperlipidemia, unspecified hyperlipidemia type    Other orders  -     donepezil (Aricept) 5 MG tablet; Take 1 tablet by mouth Every Night.  Dispense: 30 tablet; Refill: 1    1.  Memory loss- try aricept and funin 1 month may increase  2.  DM- better  3.  Hypothyroidism-  Ok with current meds  4.  HPL- ok with current meds  5. Recurrent uveiitis-  Refer rheum as per eye doc

## 2020-11-19 NOTE — PATIENT INSTRUCTIONS
Sit-to-Stand Exercise    The sit-to-stand exercise (also known as the chair stand or chair rise exercise) strengthens your lower body and helps you maintain or improve your mobility and independence. The goal is to do the sit-to-stand exercise without using your hands. This will be easier as you become stronger. You should always talk with your health care provider before starting any exercise program, especially if you have had recent surgery.  Do the exercise exactly as told by your health care provider and adjust it as directed. It is normal to feel mild stretching, pulling, tightness, or discomfort as you do this exercise, but you should stop right away if you feel sudden pain or your pain gets worse. Do not begin doing this exercise until told by your health care provider.  What the sit-to-stand exercise does  The sit-to-stand exercise helps to strengthen the muscles in your thighs and the muscles in the center of your body that give you stability (core muscles). This exercise is especially helpful if:  · You have had knee or hip surgery.  · You have trouble getting up from a chair, out of a car, or off the toilet.  How to do the sit-to-stand exercise  1. Sit toward the front edge of a sturdy chair without armrests. Your knees should be bent and your feet should be flat on the floor and shoulder-width apart.  2. Place your hands lightly on each side of the seat. Keep your back and neck as straight as possible, with your chest slightly forward.  3. Breathe in slowly. Lean forward and slightly shift your weight to the front of your feet.  4. Breathe out as you slowly stand up. Use your hands as little as possible.  5. Stand and pause for a full breath in and out.  6. Breathe in as you sit down slowly. Tighten your core and abdominal muscles to control your lowering as much as possible.  7. Breathe out slowly.  8. Do this exercise 10-15 times. If needed, do it fewer times until you build up strength.  9. Rest for  1 minute, then do another set of 10-15 repetitions.  To change the difficulty of the sit-to-stand exercise  · If the exercise is too difficult, use a chair with sturdy armrests, and push off the armrests to help you come to the standing position. You can also use the armrests to help slowly lower yourself back to sitting. As this gets easier, try to use your arms less. You can also place a firm cushion or pillow on the chair to make the surface higher.  · If this exercise is too easy, do not use your arms to help raise or lower yourself. You can also wear a weighted vest, use hand weights, increase your repetitions, or try a lower chair.  General tips  · You may feel tired when starting an exercise routine. This is normal.  · You may have muscle soreness that lasts a few days. This is normal. As you get stronger, you may not feel muscle soreness.  · Use smooth, steady movements.  · Do not  hold your breath during strength exercises. This can cause unsafe changes in your blood pressure.  · Breathe in slowly through your nose, and breathe out slowly through your mouth.  Summary  · Strengthening your lower body is an important step to help you move safely and independently.  · The sit-to-stand exercise helps strengthen the muscles in your thighs and core.  · You should always talk with your health care provider before starting any exercise program, especially if you have had recent surgery.  This information is not intended to replace advice given to you by your health care provider. Make sure you discuss any questions you have with your health care provider.  Document Released: 02/08/2018 Document Revised: 10/16/2019 Document Reviewed: 02/08/2018  Elsevier Patient Education © 2020 [employer]  Exercises To Do While Sitting    Exercises that you do while sitting (chair exercises) can give you many of the same benefits as full exercise. Benefits include strengthening your heart, burning calories, and keeping muscles  "and joints healthy. Exercise can also improve your mood and help with depression and anxiety.  You may benefit from chair exercises if you are unable to do standing exercises because of:  · Diabetic foot pain.  · Obesity.  · Illness.  · Arthritis.  · Recovery from surgery or injury.  · Breathing problems.  · Balance problems.  · Another type of disability.  Before starting chair exercises, check with your health care provider or a physical therapist to find out how much exercise you can tolerate and which exercises are safe for you. If your health care provider approves:  · Start out slowly and build up over time. Aim to work up to about 10-20 minutes for each exercise session.  · Make exercise part of your daily routine.  · Drink water when you exercise. Do not wait until you are thirsty. Drink every 10-15 minutes.  · Stop exercising right away if you have pain, nausea, shortness of breath, or dizziness.  · If you are exercising in a wheelchair, make sure to lock the wheels.  · Ask your health care provider whether you can do cam chi or yoga. Many positions in these mind-body exercises can be modified to do while seated.  Warm-up  Before starting other exercises:  1. Sit up as straight as you can. Have your knees bent at 90 degrees, which is the shape of the capital letter \"L.\" Keep your feet flat on the floor.  2. Sit at the front edge of your chair, if you can.  3. Pull in (tighten) the muscles in your abdomen and stretch your spine and neck as straight as you can. Hold this position for a few minutes.  4. Breathe in and out evenly. Try to concentrate on your breathing, and relax your mind.  Stretching  Exercise A: Arm stretch  1. Hold your arms out straight in front of your body.  2. Bend your hands at the wrist with your fingers pointing up, as if signaling someone to stop. Notice the slight tension in your forearms as you hold the position.  3. Keeping your arms out and your hands bent, rotate your hands " "outward as far as you can and hold this stretch. Aim to have your thumbs pointing up and your pinkie fingers pointing down.  Slowly repeat arm stretches for one minute as tolerated.  Exercise B: Leg stretch  1. If you can move your legs, try to \"draw\" letters on the floor with the toes of your foot. Write your name with one foot.  2. Write your name with the toes of your other foot.  Slowly repeat the movements for one minute as tolerated.  Exercise C: Reach for the ron  1. Reach your hands as far over your head as you can to stretch your spine.  2. Move your hands and arms as if you are climbing a rope.  Slowly repeat the movements for one minute as tolerated.  Range of motion exercises  Exercise A: Shoulder roll  1. Let your arms hang loosely at your sides.  2. Lift just your shoulders up toward your ears, then let them relax back down.  3. When your shoulders feel loose, rotate your shoulders in backward and forward circles.  Do shoulder rolls slowly for one minute as tolerated.  Exercise B: March in place  1. As if you are marching, pump your arms and lift your legs up and down. Lift your knees as high as you can.  ? If you are unable to lift your knees, just pump your arms and move your ankles and feet up and down.  March in place for one minute as tolerated.  Exercise C: Seated jumping jacks  1. Let your arms hang down straight.  2. Keeping your arms straight, lift them up over your head. Aim to point your fingers to the ceiling.  3. While you lift your arms, straighten your legs and slide your heels along the floor to your sides, as wide as you can.  4. As you bring your arms back down to your sides, slide your legs back together.  ? If you are unable to use your legs, just move your arms.  Slowly repeat seated jumping jacks for one minute as tolerated.  Strengthening exercises  Exercise A: Shoulder squeeze  1. Hold your arms straight out from your body to your sides, with your elbows bent and your fists " pointed at the ceiling.  2. Keeping your arms in the bent position, move them forward so your elbows and forearms meet in front of your face.  3. Open your arms back out as wide as you can with your elbows still bent, until you feel your shoulder blades squeezing together. Hold for 5 seconds.  Slowly repeat the movements forward and backward for one minute as tolerated.  Contact a health care provider if you:  · Had to stop exercising due to any of the following:  ? Pain.  ? Nausea.  ? Shortness of breath.  ? Dizziness.  ? Fatigue.  · Have significant pain or soreness after exercising.  Get help right away if you have:  · Chest pain.  · Difficulty breathing.  These symptoms may represent a serious problem that is an emergency. Do not wait to see if the symptoms will go away. Get medical help right away. Call your local emergency services (911 in the [place] Do not drive yourself to the hospital.  This information is not intended to replace advice given to you by your health care provider. Make sure you discuss any questions you have with your health care provider.  Document Released: 10/31/2018 Document Revised: 04/09/2020 Document Reviewed: 10/31/2018  Elsevier Patient Education © 2020 [employer]

## 2020-12-04 RX ORDER — OMEPRAZOLE 20 MG/1
CAPSULE, DELAYED RELEASE ORAL
Qty: 90 CAPSULE | Refills: 1 | Status: SHIPPED | OUTPATIENT
Start: 2020-12-04 | End: 2021-05-24

## 2020-12-15 ENCOUNTER — TELEMEDICINE (OUTPATIENT)
Dept: INTERNAL MEDICINE | Facility: CLINIC | Age: 85
End: 2020-12-15

## 2020-12-15 VITALS — SYSTOLIC BLOOD PRESSURE: 174 MMHG | DIASTOLIC BLOOD PRESSURE: 95 MMHG

## 2020-12-15 DIAGNOSIS — R41.3 MEMORY LOSS: ICD-10-CM

## 2020-12-15 DIAGNOSIS — M54.30 SCIATICA, UNSPECIFIED LATERALITY: ICD-10-CM

## 2020-12-15 DIAGNOSIS — I10 ESSENTIAL HYPERTENSION: ICD-10-CM

## 2020-12-15 DIAGNOSIS — Z00.00 MEDICARE ANNUAL WELLNESS VISIT, SUBSEQUENT: Primary | ICD-10-CM

## 2020-12-15 PROCEDURE — G0439 PPPS, SUBSEQ VISIT: HCPCS | Performed by: INTERNAL MEDICINE

## 2020-12-15 PROCEDURE — 99213 OFFICE O/P EST LOW 20 MIN: CPT | Performed by: INTERNAL MEDICINE

## 2020-12-15 NOTE — PATIENT INSTRUCTIONS
Medicare Wellness  Personal Prevention Plan of Service     Date of Office Visit:  12/15/2020  Encounter Provider:  Any Carbajal MD  Place of Service:  [facility]  Patient Name: Jessy Eubanks  :  1934    As part of the Medicare Wellness portion of your visit today, we are providing you with this personalized preventive plan of services (PPPS). This plan is based upon recommendations of the [place] Preventive Services Task Force (USPSTF) and the Advisory Committee on Immunization Practices (ACIP).    This lists the preventive care services that should be considered, and provides dates of when you are due. Items listed as completed are up-to-date and do not require any further intervention.    Health Maintenance   Topic Date Due   • TDAP/TD VACCINES (1 - Tdap) 10/07/1953   • ZOSTER VACCINE (2 of 3) 2012   • ANNUAL WELLNESS VISIT  2020   • HEMOGLOBIN A1C  2021   • DIABETIC EYE EXAM  10/20/2021   • LIPID PANEL  2021   • INFLUENZA VACCINE  Completed   • Pneumococcal Vaccine 65+  Completed   • MAMMOGRAM  Discontinued   • URINE MICROALBUMIN  Discontinued   • DXA SCAN  Discontinued       No orders of the defined types were placed in this encounter.      No follow-ups on file.          Sit-to-Stand Exercise    The sit-to-stand exercise (also known as the chair stand or chair rise exercise) strengthens your lower body and helps you maintain or improve your mobility and independence. The goal is to do the sit-to-stand exercise without using your hands. This will be easier as you become stronger. You should always talk with your health care provider before starting any exercise program, especially if you have had recent surgery.  Do the exercise exactly as told by your health care provider and adjust it as directed. It is normal to feel mild stretching, pulling, tightness, or discomfort as you do this exercise, but you should stop right away if you  feel sudden pain or your pain gets worse. Do not begin doing this exercise until told by your health care provider.  What the sit-to-stand exercise does  The sit-to-stand exercise helps to strengthen the muscles in your thighs and the muscles in the center of your body that give you stability (core muscles). This exercise is especially helpful if:  · You have had knee or hip surgery.  · You have trouble getting up from a chair, out of a car, or off the toilet.  How to do the sit-to-stand exercise  1. Sit toward the front edge of a sturdy chair without armrests. Your knees should be bent and your feet should be flat on the floor and shoulder-width apart.  2. Place your hands lightly on each side of the seat. Keep your back and neck as straight as possible, with your chest slightly forward.  3. Breathe in slowly. Lean forward and slightly shift your weight to the front of your feet.  4. Breathe out as you slowly stand up. Use your hands as little as possible.  5. Stand and pause for a full breath in and out.  6. Breathe in as you sit down slowly. Tighten your core and abdominal muscles to control your lowering as much as possible.  7. Breathe out slowly.  8. Do this exercise 10-15 times. If needed, do it fewer times until you build up strength.  9. Rest for 1 minute, then do another set of 10-15 repetitions.  To change the difficulty of the sit-to-stand exercise  · If the exercise is too difficult, use a chair with sturdy armrests, and push off the armrests to help you come to the standing position. You can also use the armrests to help slowly lower yourself back to sitting. As this gets easier, try to use your arms less. You can also place a firm cushion or pillow on the chair to make the surface higher.  · If this exercise is too easy, do not use your arms to help raise or lower yourself. You can also wear a weighted vest, use hand weights, increase your repetitions, or try a lower chair.  General tips  · You may  feel tired when starting an exercise routine. This is normal.  · You may have muscle soreness that lasts a few days. This is normal. As you get stronger, you may not feel muscle soreness.  · Use smooth, steady movements.  · Do not  hold your breath during strength exercises. This can cause unsafe changes in your blood pressure.  · Breathe in slowly through your nose, and breathe out slowly through your mouth.  Summary  · Strengthening your lower body is an important step to help you move safely and independently.  · The sit-to-stand exercise helps strengthen the muscles in your thighs and core.  · You should always talk with your health care provider before starting any exercise program, especially if you have had recent surgery.  This information is not intended to replace advice given to you by your health care provider. Make sure you discuss any questions you have with your health care provider.  Document Revised: 10/16/2019 Document Reviewed: 02/08/2018  Elsevier Patient Education © 2020 [employer]

## 2020-12-15 NOTE — PROGRESS NOTES
The ABCs of the Annual Wellness Visit  Subsequent Medicare Wellness Visit    No chief complaint on file.      Subjective   History of Present Illness:  Jessy Eubanks is a 86 y.o. female who presents for a Subsequent Medicare Wellness Visit.    HEALTH RISK ASSESSMENT    Recent Hospitalizations:  No hospitalization(s) within the last year.    Current Medical Providers:  Patient Care Team:  Any Carbajal MD as PCP - General  Guillermo Aguillon MD as Consulting Physician (Cardiology)    Smoking Status:  Social History     Tobacco Use   Smoking Status Never Smoker       Alcohol Consumption:  Social History     Substance and Sexual Activity   Alcohol Use No       Depression Screen:   PHQ-2/PHQ-9 Depression Screening 12/15/2020   Little interest or pleasure in doing things 0   Feeling down, depressed, or hopeless 0   Trouble falling or staying asleep, or sleeping too much 1   Feeling tired or having little energy 3   Poor appetite or overeating 0   Feeling bad about yourself - or that you are a failure or have let yourself or your family down 0   Trouble concentrating on things, such as reading the newspaper or watching television 0   Moving or speaking so slowly that other people could have noticed. Or the opposite - being so fidgety or restless that you have been moving around a lot more than usual 0   Thoughts that you would be better off dead, or of hurting yourself in some way 0   Total Score 4   If you checked off any problems, how difficult have these problems made it for you to do your work, take care of things at home, or get along with other people? Not difficult at all       Fall Risk Screen:  STEADI Fall Risk Assessment has not been completed.    Health Habits and Functional and Cognitive Screening:  Functional & Cognitive Status 12/15/2020   Do you have difficulty preparing food and eating? No   Do you have difficulty bathing yourself, getting dressed or grooming yourself? No   Do you have difficulty  using the toilet? No   Do you have difficulty moving around from place to place? No   Do you have trouble with steps or getting out of a bed or a chair? Yes   Current Diet Well Balanced Diet   Dental Exam Up to date   Eye Exam Up to date   Exercise (times per week) 7 times per week   Current Exercises Include (No Data)        Exercise Comment doing steps 3 times a day   Current Exercise Activities Include -   Do you need help using the phone?  No   Are you deaf or do you have serious difficulty hearing?  No   Do you need help with transportation? Yes   Do you need help shopping? No   Do you need help preparing meals?  No   Do you need help with housework?  Yes   Do you need help with laundry? No   Do you need help taking your medications? No   Do you need help managing money? No   Do you ever drive or ride in a car without wearing a seat belt? No   Have you felt unusual stress, anger or loneliness in the last month? Yes   Who do you live with? Spouse   If you need help, do you have trouble finding someone available to you? No   Have you been bothered in the last four weeks by sexual problems? No   Do you have difficulty concentrating, remembering or making decisions? Yes         Does the patient have evidence of cognitive impairment? Yes  Nit wanting meds for this    Asprin use counseling:Does not need ASA (and currently is not on it)    Age-appropriate Screening Schedule:  Refer to the list below for future screening recommendations based on patient's age, sex and/or medical conditions. Orders for these recommended tests are listed in the plan section. The patient has been provided with a written plan.    Health Maintenance   Topic Date Due   • TDAP/TD VACCINES (1 - Tdap) 10/07/1953   • ZOSTER VACCINE (2 of 3) 02/26/2012   • HEMOGLOBIN A1C  05/12/2021   • DIABETIC EYE EXAM  10/20/2021   • LIPID PANEL  11/12/2021   • INFLUENZA VACCINE  Completed   • MAMMOGRAM  Discontinued   • URINE MICROALBUMIN  Discontinued   •  DXA SCAN  Discontinued          The following portions of the patient's history were reviewed and updated as appropriate: allergies, current medications, past medical history, past social history and problem list.    Outpatient Medications Prior to Visit   Medication Sig Dispense Refill   • amitriptyline (ELAVIL) 10 MG tablet Take 1 tablet by mouth Every Night. 90 tablet 3   • atorvastatin (LIPITOR) 40 MG tablet TAKE 1 TABLET DAILY 90 tablet 3   • calcium (OS-ELIZABETH) 600 MG tablet Take  by mouth daily.     • carvedilol (COREG) 6.25 MG tablet Take 1 tablet by mouth 2 (Two) Times a Day.     • Cholecalciferol (VITAMIN D3) 2000 UNITS tablet Take 1 capsule by mouth.     • Coenzyme Q10 (CO Q 10) 100 MG capsule Take 1 capsule by mouth Daily.     • ENTRESTO 49-51 MG tablet Take 1 tablet by mouth 2 (Two) Times a Day.     • levothyroxine (SYNTHROID, LEVOTHROID) 100 MCG tablet TAKE 1 TABLET DAILY 90 tablet 3   • naproxen sodium (ALEVE) 220 MG tablet Take 220 mg by mouth 2 (Two) Times a Day With Meals.     • omeprazole (priLOSEC) 20 MG capsule TAKE 1 CAPSULE DAILY 90 capsule 1   • potassium chloride (K-DUR,KLOR-CON) 20 MEQ CR tablet TAKE 1 TABLET DAILY 90 tablet 3   • prednisoLONE acetate (PRED FORTE) 1 % ophthalmic suspension 1 drop Daily.     • SIMBRINZA 1-0.2 % suspension 1 drop 3 (Three) Times a Day.     • Timolol Hemihydrate (BETIMOL OP) Apply 1 drop to eye(s) as directed by provider Daily.     • travoprost, PAUL free, (TRAVATAN) 0.004 % solution ophthalmic solution 1 drop Every Night. in affected eye(s)     • donepezil (Aricept) 5 MG tablet Take 1 tablet by mouth Every Night. 30 tablet 1     No facility-administered medications prior to visit.        Patient Active Problem List   Diagnosis   • Diabetic peripheral neuropathy (CMS/HCC)   • Gastroesophageal reflux disease   • Hyperlipidemia   • Hypertension   • Hypothyroidism   • Restless legs syndrome   • Type 2 diabetes mellitus (CMS/HCC)   • Cobalamin deficiency   • Vitamin D  deficiency   • Sciatica   • Hematuria   • Chronic systolic congestive heart failure (CMS/[facility])       Advanced Care Planning:  ACP discussion was held with the patient during this visit. Patient has an advance directive in EMR which is still valid.     Review of Systems    Compared to one year ago, the patient feels her physical health is the same.  Compared to one year ago, the patient feels her mental health is the same.    Reviewed chart for potential of high risk medication in the elderly: yes  Reviewed chart for potential of harmful drug interactions in the elderly:yes    Objective         Vitals:    12/15/20 1036   BP: 174/95   PainSc:   8       There is no height or weight on file to calculate BMI.  Discussed the patient's BMI with her. The BMI is above average; BMI management plan is completed.    Physical Exam    Lab Results   Component Value Date     (H) 11/12/2020    CHLPL 148 11/12/2020    TRIG 74 11/12/2020    HDL 64 (H) 11/12/2020    LDL 70 11/12/2020    VLDL 14 11/12/2020    HGBA1C 6.10 (H) 11/12/2020        Assessment/Plan   Medicare Risks and Personalized Health Plan  CMS Preventative Services Quick Reference  Fall Risk    The above risks/problems have been discussed with the patient.  Pertinent information has been shared with the patient in the After Visit Summary.  Follow up plans and orders are seen below in the Assessment/Plan Section.    Diagnoses and all orders for this visit:    1. Medicare annual wellness visit, subsequent (Primary)    2. Essential hypertension    3. Sciatica, unspecified laterality    4. Memory loss      Follow Up:  No follow-ups on file.     An After Visit Summary and PPPS were given to the patient.       I have rec some balance exercises  I will send to patient

## 2020-12-15 NOTE — PROGRESS NOTES
Subjective   Jessy Eubanks is a 86 y.o. female here today to f/u on memory.  Pt states that the aricept made her nauseated and she stopped taking it    History of Present Illness   Pt has stopped the aricept due to nausea.  She says she does not want o try another medicine for this    The following portions of the patient's history were reviewed and updated as appropriate: allergies, current medications, past medical history, past social history and problem list.  She has been eating ok  She is walking stes for exercise    Review of Systems   All other systems reviewed and are negative.      Objective   Physical Exam  Vitals signs reviewed.   Constitutional:       Appearance: Normal appearance.   Neurological:      General: No focal deficit present.      Mental Status: She is alert and oriented to person, place, and time.   Psychiatric:         Mood and Affect: Mood normal.         Behavior: Behavior normal.         Thought Content: Thought content normal.         Vitals:    12/15/20 1036   BP: 174/95     There is no height or weight on file to calculate BMI.      Current Outpatient Medications:   •  amitriptyline (ELAVIL) 10 MG tablet, Take 1 tablet by mouth Every Night., Disp: 90 tablet, Rfl: 3  •  atorvastatin (LIPITOR) 40 MG tablet, TAKE 1 TABLET DAILY, Disp: 90 tablet, Rfl: 3  •  calcium (OS-ELIZABETH) 600 MG tablet, Take  by mouth daily., Disp: , Rfl:   •  carvedilol (COREG) 6.25 MG tablet, Take 1 tablet by mouth 2 (Two) Times a Day., Disp: , Rfl:   •  Cholecalciferol (VITAMIN D3) 2000 UNITS tablet, Take 1 capsule by mouth., Disp: , Rfl:   •  Coenzyme Q10 (CO Q 10) 100 MG capsule, Take 1 capsule by mouth Daily., Disp: , Rfl:   •  ENTRESTO 49-51 MG tablet, Take 1 tablet by mouth 2 (Two) Times a Day., Disp: , Rfl:   •  levothyroxine (SYNTHROID, LEVOTHROID) 100 MCG tablet, TAKE 1 TABLET DAILY, Disp: 90 tablet, Rfl: 3  •  naproxen sodium (ALEVE) 220 MG tablet, Take 220 mg by mouth 2 (Two) Times a Day With Meals.,  Disp: , Rfl:   •  omeprazole (priLOSEC) 20 MG capsule, TAKE 1 CAPSULE DAILY, Disp: 90 capsule, Rfl: 1  •  potassium chloride (K-DUR,KLOR-CON) 20 MEQ CR tablet, TAKE 1 TABLET DAILY, Disp: 90 tablet, Rfl: 3  •  prednisoLONE acetate (PRED FORTE) 1 % ophthalmic suspension, 1 drop Daily., Disp: , Rfl:   •  SIMBRINZA 1-0.2 % suspension, 1 drop 3 (Three) Times a Day., Disp: , Rfl:   •  Timolol Hemihydrate (BETIMOL OP), Apply 1 drop to eye(s) as directed by provider Daily., Disp: , Rfl:   •  travoprost, PAUL free, (TRAVATAN) 0.004 % solution ophthalmic solution, 1 drop Every Night. in affected eye(s), Disp: , Rfl:   •  donepezil (Aricept) 5 MG tablet, Take 1 tablet by mouth Every Night., Disp: 30 tablet, Rfl: 1       Assessment/Plan   Diagnoses and all orders for this visit:    1. Medicare annual wellness visit, subsequent (Primary)    2. Essential hypertension    3. Sciatica, unspecified laterality    4. Memory loss    1.  HTN- BP usually ok  Home monitor not working well  2.  Sciatica  Helped with help oil to rub into leg  3.  Memory loss-   She does not want any meds for this

## 2021-01-26 ENCOUNTER — TELEPHONE (OUTPATIENT)
Dept: INTERNAL MEDICINE | Facility: CLINIC | Age: 86
End: 2021-01-26

## 2021-01-26 NOTE — TELEPHONE ENCOUNTER
PATIENTS  IS CALLING IN HE IS WANTING TO KNOW IF DOCTOR THINKS PATIENT SHOULD GET THE VACCINE FOR  COVID DUE TO HER HEART CONDITION AND HER HAVING LUPUS.      PLEASE ADVISE    CALLBACK NUMBER IS:  [phone]

## 2021-03-30 RX ORDER — LEVOTHYROXINE SODIUM 0.1 MG/1
TABLET ORAL
Qty: 90 TABLET | Refills: 0 | Status: SHIPPED | OUTPATIENT
Start: 2021-03-30 | End: 2021-07-20

## 2021-04-28 ENCOUNTER — TELEPHONE (OUTPATIENT)
Dept: INTERNAL MEDICINE | Facility: CLINIC | Age: 86
End: 2021-04-28

## 2021-04-28 NOTE — TELEPHONE ENCOUNTER
PATIENT'S DAUGHTER IN-LAW IS CALLING TODAY TO ASK ABOUT GETTING SOMEONE TO DO AN ASSESSMENT OF THE PATIENT'S HOME TO MAKE SURE IT IS SAFE FOR THEM ON A DAY TO DAY BASIS. DAUGHTER IN-LAW STATES THAT THE PATIENT HAS BEEN FALLING A LOT LATELY AND SHE IS CONCERNED.     SHE MENTIONED THAT MEDICARE MAY OFFER SOME TYPE OF VISIT TO SAFEGUARD THE HOME.    DAUGHTER IN-LAW CALL BACK: [phone]    DAUGHTER IN-LAW CALLED FOR BOTH OMAIRA AND AMY AVILEZ.

## 2021-04-29 NOTE — TELEPHONE ENCOUNTER
Spoke with Cheo daughter in law Rosie-she is aware that a video visit needs to take place for documentation purposes.

## 2021-05-19 RX ORDER — ATORVASTATIN CALCIUM 40 MG/1
TABLET, FILM COATED ORAL
Qty: 90 TABLET | Refills: 3 | Status: SHIPPED | OUTPATIENT
Start: 2021-05-19 | End: 2022-05-06

## 2021-05-24 RX ORDER — POTASSIUM CHLORIDE 1500 MG/1
TABLET, EXTENDED RELEASE ORAL
Qty: 90 TABLET | Refills: 1 | Status: SHIPPED | OUTPATIENT
Start: 2021-05-24 | End: 2021-10-29

## 2021-05-24 RX ORDER — OMEPRAZOLE 20 MG/1
CAPSULE, DELAYED RELEASE ORAL
Qty: 90 CAPSULE | Refills: 1 | Status: SHIPPED | OUTPATIENT
Start: 2021-05-24 | End: 2021-11-02

## 2021-05-26 DIAGNOSIS — E11.9 TYPE 2 DIABETES MELLITUS WITHOUT COMPLICATION, UNSPECIFIED WHETHER LONG TERM INSULIN USE (HCC): ICD-10-CM

## 2021-05-26 DIAGNOSIS — E78.5 HYPERLIPIDEMIA, UNSPECIFIED HYPERLIPIDEMIA TYPE: ICD-10-CM

## 2021-05-26 DIAGNOSIS — I10 ESSENTIAL HYPERTENSION: Primary | ICD-10-CM

## 2021-05-26 DIAGNOSIS — E03.8 OTHER SPECIFIED HYPOTHYROIDISM: ICD-10-CM

## 2021-05-28 LAB
ALBUMIN SERPL-MCNC: 4.2 G/DL (ref 3.5–5.2)
ALBUMIN/GLOB SERPL: 2.2 G/DL
ALP SERPL-CCNC: 50 U/L (ref 39–117)
ALT SERPL-CCNC: 13 U/L (ref 1–33)
AST SERPL-CCNC: 17 U/L (ref 1–32)
BASOPHILS # BLD AUTO: 0.07 10*3/MM3 (ref 0–0.2)
BASOPHILS NFR BLD AUTO: 1.4 % (ref 0–1.5)
BILIRUB SERPL-MCNC: 0.5 MG/DL (ref 0–1.2)
BUN SERPL-MCNC: 15 MG/DL (ref 8–23)
BUN/CREAT SERPL: 20.8 (ref 7–25)
CALCIUM SERPL-MCNC: 9.3 MG/DL (ref 8.6–10.5)
CHLORIDE SERPL-SCNC: 109 MMOL/L (ref 98–107)
CHOLEST SERPL-MCNC: 139 MG/DL (ref 0–200)
CO2 SERPL-SCNC: 29.5 MMOL/L (ref 22–29)
CREAT SERPL-MCNC: 0.72 MG/DL (ref 0.57–1)
EOSINOPHIL # BLD AUTO: 0.25 10*3/MM3 (ref 0–0.4)
EOSINOPHIL NFR BLD AUTO: 5 % (ref 0.3–6.2)
ERYTHROCYTE [DISTWIDTH] IN BLOOD BY AUTOMATED COUNT: 15.2 % (ref 12.3–15.4)
GLOBULIN SER CALC-MCNC: 1.9 GM/DL
GLUCOSE SERPL-MCNC: 135 MG/DL (ref 65–99)
HBA1C MFR BLD: 6.3 % (ref 4.8–5.6)
HCT VFR BLD AUTO: 38.1 % (ref 34–46.6)
HDLC SERPL-MCNC: 54 MG/DL (ref 40–60)
HGB BLD-MCNC: 12.5 G/DL (ref 12–15.9)
IMM GRANULOCYTES # BLD AUTO: 0.01 10*3/MM3 (ref 0–0.05)
IMM GRANULOCYTES NFR BLD AUTO: 0.2 % (ref 0–0.5)
LDLC SERPL CALC-MCNC: 69 MG/DL (ref 0–100)
LDLC/HDLC SERPL: 1.28 {RATIO}
LYMPHOCYTES # BLD AUTO: 1.4 10*3/MM3 (ref 0.7–3.1)
LYMPHOCYTES NFR BLD AUTO: 27.8 % (ref 19.6–45.3)
MCH RBC QN AUTO: 30.3 PG (ref 26.6–33)
MCHC RBC AUTO-ENTMCNC: 32.8 G/DL (ref 31.5–35.7)
MCV RBC AUTO: 92.5 FL (ref 79–97)
MONOCYTES # BLD AUTO: 0.33 10*3/MM3 (ref 0.1–0.9)
MONOCYTES NFR BLD AUTO: 6.6 % (ref 5–12)
NEUTROPHILS # BLD AUTO: 2.97 10*3/MM3 (ref 1.7–7)
NEUTROPHILS NFR BLD AUTO: 59 % (ref 42.7–76)
NRBC BLD AUTO-RTO: 0 /100 WBC (ref 0–0.2)
PLATELET # BLD AUTO: 240 10*3/MM3 (ref 140–450)
POTASSIUM SERPL-SCNC: 4.1 MMOL/L (ref 3.5–5.2)
PROT SERPL-MCNC: 6.1 G/DL (ref 6–8.5)
RBC # BLD AUTO: 4.12 10*6/MM3 (ref 3.77–5.28)
SODIUM SERPL-SCNC: 145 MMOL/L (ref 136–145)
TRIGL SERPL-MCNC: 80 MG/DL (ref 0–150)
TSH SERPL DL<=0.005 MIU/L-ACNC: 1.6 UIU/ML (ref 0.27–4.2)
VLDLC SERPL CALC-MCNC: 16 MG/DL (ref 5–40)
WBC # BLD AUTO: 5.03 10*3/MM3 (ref 3.4–10.8)

## 2021-06-03 ENCOUNTER — OFFICE VISIT (OUTPATIENT)
Dept: INTERNAL MEDICINE | Facility: CLINIC | Age: 86
End: 2021-06-03

## 2021-06-03 VITALS
BODY MASS INDEX: 29.88 KG/M2 | WEIGHT: 152.2 LBS | TEMPERATURE: 97.3 F | DIASTOLIC BLOOD PRESSURE: 72 MMHG | HEART RATE: 64 BPM | HEIGHT: 60 IN | OXYGEN SATURATION: 96 % | SYSTOLIC BLOOD PRESSURE: 120 MMHG

## 2021-06-03 DIAGNOSIS — E78.5 HYPERLIPIDEMIA, UNSPECIFIED HYPERLIPIDEMIA TYPE: ICD-10-CM

## 2021-06-03 DIAGNOSIS — I10 ESSENTIAL HYPERTENSION: ICD-10-CM

## 2021-06-03 DIAGNOSIS — E11.9 TYPE 2 DIABETES MELLITUS WITHOUT COMPLICATION, UNSPECIFIED WHETHER LONG TERM INSULIN USE (HCC): Primary | ICD-10-CM

## 2021-06-03 PROCEDURE — 99397 PER PM REEVAL EST PAT 65+ YR: CPT | Performed by: INTERNAL MEDICINE

## 2021-06-03 RX ORDER — LACTOBACILLUS ACIDOPHILUS / LACTOBACILLUS BULGARICUS 100 MILLION CFU STRENGTH
GRANULES ORAL
COMMUNITY

## 2021-06-03 NOTE — PATIENT INSTRUCTIONS
Sit-to-Stand Exercise    The sit-to-stand exercise (also known as the chair stand or chair rise exercise) strengthens your lower body and helps you maintain or improve your mobility and independence. The goal is to do the sit-to-stand exercise without using your hands. This will be easier as you become stronger. You should always talk with your health care provider before starting any exercise program, especially if you have had recent surgery.  Do the exercise exactly as told by your health care provider and adjust it as directed. It is normal to feel mild stretching, pulling, tightness, or discomfort as you do this exercise, but you should stop right away if you feel sudden pain or your pain gets worse. Do not begin doing this exercise until told by your health care provider.  What the sit-to-stand exercise does  The sit-to-stand exercise helps to strengthen the muscles in your thighs and the muscles in the center of your body that give you stability (core muscles). This exercise is especially helpful if:  · You have had knee or hip surgery.  · You have trouble getting up from a chair, out of a car, or off the toilet.  How to do the sit-to-stand exercise  1. Sit toward the front edge of a sturdy chair without armrests. Your knees should be bent and your feet should be flat on the floor and shoulder-width apart.  2. Place your hands lightly on each side of the seat. Keep your back and neck as straight as possible, with your chest slightly forward.  3. Breathe in slowly. Lean forward and slightly shift your weight to the front of your feet.  4. Breathe out as you slowly stand up. Use your hands as little as possible.  5. Stand and pause for a full breath in and out.  6. Breathe in as you sit down slowly. Tighten your core and abdominal muscles to control your lowering as much as possible.  7. Breathe out slowly.  8. Do this exercise 10-15 times. If needed, do it fewer times until you build up strength.  9. Rest for  1 minute, then do another set of 10-15 repetitions.  To change the difficulty of the sit-to-stand exercise  · If the exercise is too difficult, use a chair with sturdy armrests, and push off the armrests to help you come to the standing position. You can also use the armrests to help slowly lower yourself back to sitting. As this gets easier, try to use your arms less. You can also place a firm cushion or pillow on the chair to make the surface higher.  · If this exercise is too easy, do not use your arms to help raise or lower yourself. You can also wear a weighted vest, use hand weights, increase your repetitions, or try a lower chair.  General tips  · You may feel tired when starting an exercise routine. This is normal.  · You may have muscle soreness that lasts a few days. This is normal. As you get stronger, you may not feel muscle soreness.  · Use smooth, steady movements.  · Do not  hold your breath during strength exercises. This can cause unsafe changes in your blood pressure.  · Breathe in slowly through your nose, and breathe out slowly through your mouth.  Summary  · Strengthening your lower body is an important step to help you move safely and independently.  · The sit-to-stand exercise helps strengthen the muscles in your thighs and core.  · You should always talk with your health care provider before starting any exercise program, especially if you have had recent surgery.  This information is not intended to replace advice given to you by your health care provider. Make sure you discuss any questions you have with your health care provider.  Document Revised: 10/16/2019 Document Reviewed: 02/08/2018  Elsevier Patient Education © 2021 [employer]    Exercises To Do While Sitting    Exercises that you do while sitting (chair exercises) can give you many of the same benefits as full exercise. Benefits include strengthening your heart, burning calories, and keeping muscles and joints healthy. Exercise  "can also improve your mood and help with depression and anxiety.  You may benefit from chair exercises if you are unable to do standing exercises because of:  · Diabetic foot pain.  · Obesity.  · Illness.  · Arthritis.  · Recovery from surgery or injury.  · Breathing problems.  · Balance problems.  · Another type of disability.  Before starting chair exercises, check with your health care provider or a physical therapist to find out how much exercise you can tolerate and which exercises are safe for you. If your health care provider approves:  · Start out slowly and build up over time. Aim to work up to about 10-20 minutes for each exercise session.  · Make exercise part of your daily routine.  · Drink water when you exercise. Do not wait until you are thirsty. Drink every 10-15 minutes.  · Stop exercising right away if you have pain, nausea, shortness of breath, or dizziness.  · If you are exercising in a wheelchair, make sure to lock the wheels.  · Ask your health care provider whether you can do cam chi or yoga. Many positions in these mind-body exercises can be modified to do while seated.  Warm-up  Before starting other exercises:  1. Sit up as straight as you can. Have your knees bent at 90 degrees, which is the shape of the capital letter \"L.\" Keep your feet flat on the floor.  2. Sit at the front edge of your chair, if you can.  3. Pull in (tighten) the muscles in your abdomen and stretch your spine and neck as straight as you can. Hold this position for a few minutes.  4. Breathe in and out evenly. Try to concentrate on your breathing, and relax your mind.  Stretching  Exercise A: Arm stretch  1. Hold your arms out straight in front of your body.  2. Bend your hands at the wrist with your fingers pointing up, as if signaling someone to stop. Notice the slight tension in your forearms as you hold the position.  3. Keeping your arms out and your hands bent, rotate your hands outward as far as you can and hold " "this stretch. Aim to have your thumbs pointing up and your pinkie fingers pointing down.  Slowly repeat arm stretches for one minute as tolerated.  Exercise B: Leg stretch  1. If you can move your legs, try to \"draw\" letters on the floor with the toes of your foot. Write your name with one foot.  2. Write your name with the toes of your other foot.  Slowly repeat the movements for one minute as tolerated.  Exercise C: Reach for the ron  1. Reach your hands as far over your head as you can to stretch your spine.  2. Move your hands and arms as if you are climbing a rope.  Slowly repeat the movements for one minute as tolerated.  Range of motion exercises  Exercise A: Shoulder roll  1. Let your arms hang loosely at your sides.  2. Lift just your shoulders up toward your ears, then let them relax back down.  3. When your shoulders feel loose, rotate your shoulders in backward and forward circles.  Do shoulder rolls slowly for one minute as tolerated.  Exercise B: March in place  1. As if you are marching, pump your arms and lift your legs up and down. Lift your knees as high as you can.  ? If you are unable to lift your knees, just pump your arms and move your ankles and feet up and down.  March in place for one minute as tolerated.  Exercise C: Seated jumping jacks  1. Let your arms hang down straight.  2. Keeping your arms straight, lift them up over your head. Aim to point your fingers to the ceiling.  3. While you lift your arms, straighten your legs and slide your heels along the floor to your sides, as wide as you can.  4. As you bring your arms back down to your sides, slide your legs back together.  ? If you are unable to use your legs, just move your arms.  Slowly repeat seated jumping jacks for one minute as tolerated.  Strengthening exercises  Exercise A: Shoulder squeeze  1. Hold your arms straight out from your body to your sides, with your elbows bent and your fists pointed at the ceiling.  2. Keeping " your arms in the bent position, move them forward so your elbows and forearms meet in front of your face.  3. Open your arms back out as wide as you can with your elbows still bent, until you feel your shoulder blades squeezing together. Hold for 5 seconds.  Slowly repeat the movements forward and backward for one minute as tolerated.  Contact a health care provider if you:  · Had to stop exercising due to any of the following:  ? Pain.  ? Nausea.  ? Shortness of breath.  ? Dizziness.  ? Fatigue.  · Have significant pain or soreness after exercising.  Get help right away if you have:  · Chest pain.  · Difficulty breathing.  These symptoms may represent a serious problem that is an emergency. Do not wait to see if the symptoms will go away. Get medical help right away. Call your local emergency services (911 in the [place] Do not drive yourself to the hospital.  This information is not intended to replace advice given to you by your health care provider. Make sure you discuss any questions you have with your health care provider.  Document Revised: 04/09/2020 Document Reviewed: 10/31/2018  Elsevier Patient Education © 2021 [employer]

## 2021-06-03 NOTE — PROGRESS NOTES
"Subjective   Jessy Eubanks is a 86 y.o. female and is here for a comprehensive physical exam. The patient reports problems - chronic eye inflammation.  She was recently seem by eye doctor and rheum for her eyes but they are starting to get more red  No itching or watering  Pt has been doing well with thyroid meds.  Taking as perscribed without any complications  Pt has been taking cholesterol meds as prescribed.  No difficulties with myalgias.     Pt is UTD with annual gyn exam and mammo she is utd     Do you take any herbs or supplements that were not prescribed by a doctor? See list      Social History: no tob no etoh  Social History     Socioeconomic History   • Marital status:      Spouse name: Theodore Eubanks   • Number of children: 3   • Years of education: Not on file   • Highest education level: Not on file   Tobacco Use   • Smoking status: Never Smoker   Substance and Sexual Activity   • Alcohol use: No   • Drug use: No       Family History:   Family History   Problem Relation Age of Onset   • Hypertension Mother    • Thyroid disease Mother    • Stroke Maternal Grandfather    • Colon cancer Daughter        Past Medical History:   Past Medical History:   Diagnosis Date   • Abnormal mammogram    • Carotid artery stenosis    • CHF (congestive heart failure) (CMS/HCC)    • Diabetes mellitus (CMS/HCC)    • GERD (gastroesophageal reflux disease)    • HLD (hyperlipidemia)    • HTN (hypertension)    • Hypertension    • Hypokalemia    • Hypothyroidism    • Osteopenia    • Postmenopausal    • RLS (restless legs syndrome)    • Sciatica    • Trigger finger    • Type 2 diabetes mellitus (CMS/HCC)    • Vitamin B12 deficiency    • Vitamin D deficiency            Review of Systems    A comprehensive review of systems was negative.    Objective   /72 (BP Location: Left arm, Patient Position: Sitting, Cuff Size: Adult)   Pulse 64   Temp 97.3 °F (36.3 °C)   Ht 153 cm (60.24\")   Wt 69 kg (152 lb 3.2 oz)   " SpO2 96%   BMI 29.49 kg/m²     General Appearance:    Alert, cooperative, no distress, appears stated age   Head:    Normocephalic, without obvious abnormality, atraumatic   Eyes:    PERRL, conjunctiva/corneas clear, EOM's intact, fundi     benign, both eyes   Ears:    Normal TM's and external ear canals, both ears   Nose:   Nares normal, septum midline, mucosa normal, no drainage    or sinus tenderness   Throat:   Lips, mucosa, and tongue normal; teeth and gums normal   Neck:   Supple, symmetrical, trachea midline, no adenopathy;     thyroid:  no enlargement/tenderness/nodules; no carotid    bruit or JVD   Back:     Symmetric, no curvature, ROM normal, no CVA tenderness   Lungs:     Clear to auscultation bilaterally, respirations unlabored   Chest Wall:    No tenderness or deformity    Heart:    Regular rate and rhythm, S1 and S2 normal, no murmur, rub   or gallop   Breast Exam:    No tenderness, masses, or nipple abnormality   Abdomen:     Soft, non-tender, bowel sounds active all four quadrants,     no masses, no organomegaly   Genitalia:    Normal female without lesion, discharge or tenderness   Rectal:    Normal tone, no masses or tenderness; guaiac negative stool   Extremities:   Extremities normal, atraumatic, no cyanosis or edema   Pulses:   2+ and symmetric all extremities   Skin:   Skin color, texture, turgor normal, no rashes or lesions   Lymph nodes:   Cervical, supraclavicular, and axillary nodes normal   Neurologic:   CNII-XII intact, normal strength, sensation and reflexes     throughout       Vitals:    06/03/21 1258   BP: 120/72   Pulse: 64   Temp: 97.3 °F (36.3 °C)   SpO2: 96%     Body mass index is 29.49 kg/m².      Medications:   Current Outpatient Medications:   •  atorvastatin (LIPITOR) 40 MG tablet, TAKE 1 TABLET DAILY, Disp: 90 tablet, Rfl: 3  •  calcium (OS-ELIZABETH) 600 MG tablet, Take  by mouth daily., Disp: , Rfl:   •  carvedilol (COREG) 6.25 MG tablet, Take 1 tablet by mouth 2 (Two) Times a  Day., Disp: , Rfl:   •  Cholecalciferol (VITAMIN D3) 2000 UNITS tablet, Take 1 capsule by mouth., Disp: , Rfl:   •  Coenzyme Q10 (CO Q 10) 100 MG capsule, Take 1 capsule by mouth Daily., Disp: , Rfl:   •  ENTRESTO 49-51 MG tablet, Take 1 tablet by mouth 2 (Two) Times a Day., Disp: , Rfl:   •  KLOR-CON 20 MEQ CR tablet, TAKE 1 TABLET DAILY, Disp: 90 tablet, Rfl: 1  •  Lactobacillus (Floranex) pack oral packet, Take  by mouth., Disp: , Rfl:   •  levothyroxine (SYNTHROID, LEVOTHROID) 100 MCG tablet, TAKE 1 TABLET DAILY, Disp: 90 tablet, Rfl: 0  •  naproxen sodium (ALEVE) 220 MG tablet, Take 220 mg by mouth 2 (Two) Times a Day With Meals., Disp: , Rfl:   •  omeprazole (priLOSEC) 20 MG capsule, TAKE 1 CAPSULE DAILY, Disp: 90 capsule, Rfl: 1  •  Polyethyl Glycol-Propyl Glycol 0.4-0.3 % gel, Apply  to eye(s) as directed by provider., Disp: , Rfl:   •  prednisoLONE acetate (PRED FORTE) 1 % ophthalmic suspension, 1 drop Daily., Disp: , Rfl:   •  donepezil (Aricept) 5 MG tablet, Take 1 tablet by mouth Every Night., Disp: 30 tablet, Rfl: 1  •  SIMBRINZA 1-0.2 % suspension, 1 drop 3 (Three) Times a Day., Disp: , Rfl:   •  Timolol Hemihydrate (BETIMOL OP), Apply 1 drop to eye(s) as directed by provider Daily., Disp: , Rfl:   •  travoprost, PAUL free, (TRAVATAN) 0.004 % solution ophthalmic solution, 1 drop Every Night. in affected eye(s), Disp: , Rfl:        Assessment/Plan   Healthy female exam.      1. Healthcare Maintenance:  2. Patient Counseling:  --Nutrition: Stressed importance of moderation in sodium/caffeine intake, saturated fat and cholesterol, caloric balance, sufficient intake of fresh fruits, vegetables, fiber, calcium and vit D  --Exercise: . I have rec sit to stand exercise  --Substance Abuse: no tob no etoh  --Dental health: she does go to the dentist reg  --Immunizations reviewed.rec shingles vaccine  --Discussed benefits of screening colonoscopy.  3.  Hypothyroidism-  Ok with current meds  4.  HPL-  Ok with  current meds   5.  Sciatica-  rec some reg exercises   6.  Chronic iritis- follow with rheum

## 2021-07-20 RX ORDER — LEVOTHYROXINE SODIUM 0.1 MG/1
TABLET ORAL
Qty: 90 TABLET | Refills: 1 | Status: SHIPPED | OUTPATIENT
Start: 2021-07-20 | End: 2021-12-30

## 2021-07-21 ENCOUNTER — TELEPHONE (OUTPATIENT)
Dept: INTERNAL MEDICINE | Facility: CLINIC | Age: 86
End: 2021-07-21

## 2021-07-21 NOTE — TELEPHONE ENCOUNTER
PATIENTS  IS CALLING TO LET DOCTOR KNOW THAT PATIENT WAS ADMITTED TO [facility] ON [facility] ON Saturday AND RELEASED ON Sunday AND WANTED DOCTOR TO LOOK OVER THE INFORMATION FROM THAT VISIT.

## 2021-08-18 RX ORDER — AMITRIPTYLINE HYDROCHLORIDE 10 MG/1
TABLET, FILM COATED ORAL
Qty: 90 TABLET | Refills: 3 | Status: SHIPPED | OUTPATIENT
Start: 2021-08-18 | End: 2022-02-28

## 2021-10-29 RX ORDER — POTASSIUM CHLORIDE 1500 MG/1
TABLET, EXTENDED RELEASE ORAL
Qty: 90 TABLET | Refills: 1 | Status: SHIPPED | OUTPATIENT
Start: 2021-10-29

## 2021-11-02 RX ORDER — OMEPRAZOLE 20 MG/1
CAPSULE, DELAYED RELEASE ORAL
Qty: 90 CAPSULE | Refills: 3 | Status: SHIPPED | OUTPATIENT
Start: 2021-11-02

## 2021-12-30 RX ORDER — LEVOTHYROXINE SODIUM 0.1 MG/1
TABLET ORAL
Qty: 90 TABLET | Refills: 3 | Status: SHIPPED | OUTPATIENT
Start: 2021-12-30

## 2022-02-22 ENCOUNTER — TELEPHONE (OUTPATIENT)
Dept: INTERNAL MEDICINE | Facility: CLINIC | Age: 87
End: 2022-02-22

## 2022-02-22 NOTE — TELEPHONE ENCOUNTER
Caller: AMORASAEL العلي    Relationship to patient: Child    Best call back number: [phone]    Chief complaint: PATIENT HAD BLOOD WORK LAST WEEK.    Type of visit: OFFICE FOLLOW UP    Requested date: UNKNOWN    If rescheduling, when is the original appointment: N/A    Additional notes:PATIENT HAD APPT SCHEDULED LAST WEEK AND LOST HER  AND CANCELLED.    DAUGHTER (AMOR) CALLED STATED THAT SHE IS PATIENTS HEALTHCARE SURROGATE AND WILL BE BRINGING IN THAT PAPERWORK.    AMOR REQUESTING TO SEE IF PATIENT IS IN NEED OF A FOLLOW UP APPT AFTER PATIENTS LABS.    AMOR IS NOT LISTED ON THE PATIENTS HIPPA.    AMOR IS REQUESTING PATIENT FOR A TELEHEALTH VISIT IF IN NEED OF A VISIT.    PLEASE ADVISE AMOR TODAY, AMOR REQUESTING TO HAVE APPT SCHEDULED PRIOR TO MARCH 2 SO SHE CAN BE INVOLVED.  AMOR WILL BE RETURNING ON MARCH 7TH FOR A SHORT TIME.  AMOR IS REQUESTING AN APPT THIS WEEK VIA TELEHEALTH IF POSSIBLE.

## 2022-02-28 ENCOUNTER — LAB (OUTPATIENT)
Dept: LAB | Facility: HOSPITAL | Age: 87
End: 2022-02-28

## 2022-02-28 ENCOUNTER — HOSPITAL ENCOUNTER (OUTPATIENT)
Dept: GENERAL RADIOLOGY | Facility: HOSPITAL | Age: 87
Discharge: HOME | End: 2022-02-28

## 2022-02-28 ENCOUNTER — OFFICE VISIT (OUTPATIENT)
Dept: INTERNAL MEDICINE | Facility: CLINIC | Age: 87
End: 2022-02-28

## 2022-02-28 VITALS
WEIGHT: 140 LBS | BODY MASS INDEX: 27.48 KG/M2 | HEIGHT: 60 IN | DIASTOLIC BLOOD PRESSURE: 68 MMHG | SYSTOLIC BLOOD PRESSURE: 122 MMHG | OXYGEN SATURATION: 98 % | TEMPERATURE: 98 F | HEART RATE: 62 BPM

## 2022-02-28 DIAGNOSIS — K21.9 GASTROESOPHAGEAL REFLUX DISEASE, UNSPECIFIED WHETHER ESOPHAGITIS PRESENT: ICD-10-CM

## 2022-02-28 DIAGNOSIS — Z11.1 SCREENING FOR TUBERCULOSIS: ICD-10-CM

## 2022-02-28 DIAGNOSIS — E03.8 OTHER SPECIFIED HYPOTHYROIDISM: ICD-10-CM

## 2022-02-28 DIAGNOSIS — E78.5 HYPERLIPIDEMIA, UNSPECIFIED HYPERLIPIDEMIA TYPE: ICD-10-CM

## 2022-02-28 DIAGNOSIS — Z20.822 CLOSE EXPOSURE TO COVID-19 VIRUS: Primary | ICD-10-CM

## 2022-02-28 PROCEDURE — 36415 COLL VENOUS BLD VENIPUNCTURE: CPT | Performed by: INTERNAL MEDICINE

## 2022-02-28 PROCEDURE — 99214 OFFICE O/P EST MOD 30 MIN: CPT | Performed by: INTERNAL MEDICINE

## 2022-02-28 PROCEDURE — 71046 X-RAY EXAM CHEST 2 VIEWS: CPT

## 2022-02-28 PROCEDURE — 86480 TB TEST CELL IMMUN MEASURE: CPT | Performed by: INTERNAL MEDICINE

## 2022-02-28 RX ORDER — MULTIVIT-MIN/IRON/FOLIC ACID/K 18-600-40
CAPSULE ORAL
COMMUNITY

## 2022-02-28 RX ORDER — ZINC GLUCONATE 50 MG
TABLET ORAL
COMMUNITY

## 2022-02-28 RX ORDER — CHLORAL HYDRATE 500 MG
CAPSULE ORAL
COMMUNITY

## 2022-02-28 NOTE — PROGRESS NOTES
Subjective   Jessy Eubanks is a 87 y.o. female here today to f/u on hypothyroidism, HTN and hyperlipidemia.  Pt is moving to [place] with her family.    History of Present Illness    recently passed  She is looking into moving to a halfway community in [place]    Pt has been taking cholesterol meds as prescribed.  No difficulties with myalgias.   Pt has been doing well with thyroid meds.  Taking as perscribed without any complications    The following portions of the patient's history were reviewed and updated as appropriate: allergies, current medications, past medical history, past social history and problem list.  She does eat ok    Review of Systems    Objective   Physical Exam  Vitals reviewed.   Constitutional:       Appearance: She is well-developed.   HENT:      Head: Normocephalic and atraumatic.      Right Ear: External ear normal.      Left Ear: External ear normal.   Eyes:      Conjunctiva/sclera: Conjunctivae normal.      Pupils: Pupils are equal, round, and reactive to light.   Neck:      Thyroid: No thyromegaly.      Trachea: No tracheal deviation.   Cardiovascular:      Rate and Rhythm: Normal rate and regular rhythm.      Heart sounds: Normal heart sounds.   Pulmonary:      Effort: Pulmonary effort is normal.      Breath sounds: Normal breath sounds.   Abdominal:      General: Bowel sounds are normal. There is no distension.      Palpations: Abdomen is soft.      Tenderness: There is no abdominal tenderness.   Musculoskeletal:         General: No deformity. Normal range of motion.      Cervical back: Normal range of motion.   Skin:     General: Skin is warm and dry.   Neurological:      Mental Status: She is alert and oriented to person, place, and time.   Psychiatric:         Behavior: Behavior normal.         Thought Content: Thought content normal.         Judgment: Judgment normal.         Vitals:    02/28/22 1157   BP: 122/68   Pulse: 62   Temp: 98 °F (36.7 °C)   SpO2: 98%     Body  mass index is 27.12 kg/m².    Results Encounter on 12/03/2021   Component Date Value Ref Range Status   • C difficile Toxins A+B, EIA 01/18/2022 Negative  Negative Final   • Glucose 01/18/2022 147* 65 - 99 mg/dL Final   • BUN 01/18/2022 7* 8 - 27 mg/dL Final   • Creatinine 01/18/2022 0.69  0.57 - 1.00 mg/dL Final   • eGFR Non  Am 01/18/2022 79  >59 mL/min/1.73 Final   • eGFR African Am 01/18/2022 91  >59 mL/min/1.73 Final    Comment: **In accordance with recommendations from the NKF-ASN Task force,**    [facility] is in the process of updating its eGFR calculation to the    2021 CKD-EPI creatinine equation that estimates kidney function    without a race variable.     • BUN/Creatinine Ratio 01/18/2022 10* 12 - 28 Final   • Sodium 01/18/2022 145* 134 - 144 mmol/L Final   • Potassium 01/18/2022 3.9  3.5 - 5.2 mmol/L Final   • Chloride 01/18/2022 104  96 - 106 mmol/L Final   • Total CO2 01/18/2022 27  20 - 29 mmol/L Final   • Calcium 01/18/2022 8.9  8.7 - 10.3 mg/dL Final   • Total Protein 01/18/2022 6.2  6.0 - 8.5 g/dL Final   • Albumin 01/18/2022 3.8  3.6 - 4.6 g/dL Final   • Globulin 01/18/2022 2.4  1.5 - 4.5 g/dL Final   • A/G Ratio 01/18/2022 1.6  1.2 - 2.2 Final   • Total Bilirubin 01/18/2022 0.6  0.0 - 1.2 mg/dL Final   • Alkaline Phosphatase 01/18/2022 72  44 - 121 IU/L Final   • AST (SGOT) 01/18/2022 26  0 - 40 IU/L Final   • ALT (SGPT) 01/18/2022 17  0 - 32 IU/L Final   • Hemoglobin A1C 01/18/2022 6.2* 4.8 - 5.6 % Final    Comment:          Prediabetes: 5.7 - 6.4           Diabetes: >6.4           Glycemic control for adults with diabetes: <7.0     • Total Cholesterol 01/18/2022 135  100 - 199 mg/dL Final   • Triglycerides 01/18/2022 98  0 - 149 mg/dL Final   • HDL Cholesterol 01/18/2022 49  >39 mg/dL Final   • VLDL Cholesterol Daron 01/18/2022 18  5 - 40 mg/dL Final   • LDL Chol Calc ([facility]) 01/18/2022 68  0 - 99 mg/dL Final   • LDL/HDL RATIO 01/18/2022 1.4  0.0 - 3.2 ratio Final    Comment:                                      LDL/HDL Ratio                                              Men  Women                                1/2 Avg.Risk  1.0    1.5                                    Avg.Risk  3.6    3.2                                 2X Avg.Risk  6.2    5.0                                 3X Avg.Risk  8.0    6.1     • TSH 01/18/2022 2.760  0.450 - 4.500 uIU/mL Final         Current Outpatient Medications:   •  Ascorbic Acid (Vitamin C) 500 MG capsule, Take  by mouth., Disp: , Rfl:   •  atorvastatin (LIPITOR) 40 MG tablet, TAKE 1 TABLET DAILY, Disp: 90 tablet, Rfl: 3  •  calcium (OS-ELIZABETH) 600 MG tablet, Take  by mouth daily., Disp: , Rfl:   •  carvedilol (COREG) 6.25 MG tablet, Take 1 tablet by mouth 2 (Two) Times a Day., Disp: , Rfl:   •  Cholecalciferol (VITAMIN D3) 2000 UNITS tablet, Take 1 capsule by mouth., Disp: , Rfl:   •  Coenzyme Q10 (CO Q 10) 100 MG capsule, Take 1 capsule by mouth Daily., Disp: , Rfl:   •  ENTRESTO 49-51 MG tablet, Take 1 tablet by mouth 2 (Two) Times a Day., Disp: , Rfl:   •  KLOR-CON 20 MEQ CR tablet, TAKE 1 TABLET DAILY, Disp: 90 tablet, Rfl: 1  •  Lactobacillus (Floranex) pack oral packet, Take  by mouth., Disp: , Rfl:   •  levothyroxine (SYNTHROID, LEVOTHROID) 100 MCG tablet, TAKE 1 TABLET DAILY, Disp: 90 tablet, Rfl: 3  •  Omega-3 1000 MG capsule, Take  by mouth., Disp: , Rfl:   •  omeprazole (priLOSEC) 20 MG capsule, TAKE 1 CAPSULE DAILY, Disp: 90 capsule, Rfl: 3  •  Zinc 50 MG tablet, Take  by mouth., Disp: , Rfl:        Assessment/Plan   Diagnoses and all orders for this visit:    1. Close exposure to COVID-19 virus (Primary)  -     Cancel: QuantiFERON TB Gold; Future  -     Cancel: SARS-CoV-2 Antibodies (Roche)  -     SARS-CoV-2 Antibodies (Roche)  -     QuantiFERON TB Gold    2. Screening for tuberculosis  -     XR Chest PA & Lateral  -     Cancel: SARS-CoV-2 Antibodies (Roche)  -     SARS-CoV-2 Antibodies (Roche)  -     QuantiFERON TB Gold    3. Gastroesophageal reflux disease,  unspecified whether esophagitis present    4. Other specified hypothyroidism    5. Hyperlipidemia, unspecified hyperlipidemia type      1. GERD- we will try to wean and see how she does  If sahe has sx we can add it back  2.  HPL-continue the atorvastatin  3.  Hypothyroidism: We will continue current medications  4.  Cerumen impaction in the right ear.  She is going to try Debrox

## 2022-03-02 LAB
GAMMA INTERFERON BACKGROUND BLD IA-ACNC: 0.05 IU/ML
M TB IFN-G BLD-IMP: NEGATIVE
M TB IFN-G CD4+ BCKGRND COR BLD-ACNC: 0.04 IU/ML
M TB IFN-G CD4+CD8+ BCKGRND COR BLD-ACNC: 0.05 IU/ML
MITOGEN IGNF BLD-ACNC: 7.32 IU/ML
QUANTIFERON INCUBATION: NORMAL
SERVICE CMNT-IMP: NORMAL

## 2022-05-06 RX ORDER — ATORVASTATIN CALCIUM 40 MG/1
TABLET, FILM COATED ORAL
Qty: 90 TABLET | Refills: 1 | Status: SHIPPED | OUTPATIENT
Start: 2022-05-06

## 2023-05-09 NOTE — TELEPHONE ENCOUNTER
F/u scheduled for 6/2021 Clindamycin Pregnancy And Lactation Text: This medication can be used in pregnancy if certain situations. Clindamycin is also present in breast milk.